# Patient Record
Sex: FEMALE | Race: WHITE | Employment: OTHER | URBAN - METROPOLITAN AREA
[De-identification: names, ages, dates, MRNs, and addresses within clinical notes are randomized per-mention and may not be internally consistent; named-entity substitution may affect disease eponyms.]

---

## 2017-01-03 ENCOUNTER — TELEPHONE (OUTPATIENT)
Dept: OBGYN CLINIC | Facility: CLINIC | Age: 37
End: 2017-01-03

## 2017-07-18 ENCOUNTER — TELEPHONE (OUTPATIENT)
Dept: OBGYN CLINIC | Facility: CLINIC | Age: 37
End: 2017-07-18

## 2017-07-18 NOTE — TELEPHONE ENCOUNTER
Pt needs a copy of her lab report with pap smear result w Dr. Carmita Prieto on 6/14/16 , Pt would like this faxed to Drs fax FAX # [phone] . Can this be faxed td? Call once sent [facility] if no answer.  #[phone]. Also mail a copy to home address.

## 2017-10-24 ENCOUNTER — TELEPHONE (OUTPATIENT)
Dept: PEDIATRICS CLINIC | Facility: CLINIC | Age: 37
End: 2017-10-24

## 2017-10-24 NOTE — TELEPHONE ENCOUNTER
Pt states that she started bleeding heavily again today and spoke with fertility specialist. Pt has an [address] and saw the heartbeat. Pt would like to be seen next week as she was told that she ,ay have bleeding off and on.  Pt declines appt today, would like to

## 2017-10-24 NOTE — TELEPHONE ENCOUNTER
Pt calling to report that she is currently seeing IVF and would like to start care here. Pt stated her LMP is 9/6/17 and transfer date with IVF was 9/25/17. Pt stated she is about 6 weeks.  Pt informed that our practice has 6 MDs and pt will rotate through

## 2017-10-25 ENCOUNTER — TELEPHONE (OUTPATIENT)
Dept: OBGYN CLINIC | Facility: CLINIC | Age: 37
End: 2017-10-25

## 2017-10-25 NOTE — TELEPHONE ENCOUNTER
Pt notified we received 10 out of 21 pages of records. Pt states she will bring all records when she comes in for appt.

## 2017-10-31 ENCOUNTER — TELEPHONE (OUTPATIENT)
Dept: OBGYN CLINIC | Facility: CLINIC | Age: 37
End: 2017-10-31

## 2017-10-31 ENCOUNTER — OFFICE VISIT (OUTPATIENT)
Dept: OBGYN CLINIC | Facility: CLINIC | Age: 37
End: 2017-10-31

## 2017-10-31 VITALS
DIASTOLIC BLOOD PRESSURE: 88 MMHG | BODY MASS INDEX: 32 KG/M2 | HEART RATE: 82 BPM | WEIGHT: 147 LBS | SYSTOLIC BLOOD PRESSURE: 136 MMHG

## 2017-10-31 DIAGNOSIS — O20.9 FIRST TRIMESTER BLEEDING: Primary | ICD-10-CM

## 2017-10-31 PROCEDURE — 76815 OB US LIMITED FETUS(S): CPT | Performed by: OBSTETRICS & GYNECOLOGY

## 2017-10-31 PROCEDURE — 99213 OFFICE O/P EST LOW 20 MIN: CPT | Performed by: OBSTETRICS & GYNECOLOGY

## 2017-10-31 RX ORDER — PRENATAL VIT/IRON FUM/FOLIC AC 27MG-0.8MG
1 TABLET ORAL DAILY
COMMUNITY
End: 2022-01-04

## 2017-10-31 NOTE — TELEPHONE ENCOUNTER
Patient with first trimester bleeding. She saw her fertility speicialists in the past for it. She is O neg and forgot to discuss rhogam.  Can you see if she received it at the fertility clinic?  Otherwise, she will need rhogam shot

## 2017-10-31 NOTE — TELEPHONE ENCOUNTER
Reviewed with DANNY, pt is actually O POS so no Rhogam needed. Explained to pt and reiterated bleeding precautions. Pt verbalized understanding and has OBN next week.

## 2017-10-31 NOTE — H&P
HPI:  The patient is a 44 yo  @ 7w6d by day 5 SET with OCTAVIA. Presents c/o 2 weeks of intermittent bleeding. She describes the bleeding as a persistent spotting that has fluctuated over the last 2 weeks.   She had seen her reproductive endocrinolog MEDICATIONS:    Current Outpatient Prescriptions:   •  PRENATAL 27-0.8 MG Oral Tab, Take 1 tablet by mouth daily. , Disp: , Rfl:   •  ValACYclovir HCl 1 G Oral Tab, , Disp: , Rfl:   •  Levothyroxine Sodium 25 MCG Oral Tab, Take 25 mcg by mouth before time given bleeding, closed cervix, and positive fetal heart tones. Would recommend patient continue pelvic rest and lifting precautions. Miscarriage precautions were given. The patient's to make an appointment for her nurse visit for prenatal care.   No

## 2017-11-07 ENCOUNTER — NURSE ONLY (OUTPATIENT)
Dept: OBGYN CLINIC | Facility: CLINIC | Age: 37
End: 2017-11-07

## 2017-11-07 VITALS — WEIGHT: 146.19 LBS | HEIGHT: 56 IN | BODY MASS INDEX: 32.89 KG/M2

## 2017-11-07 DIAGNOSIS — Z34.81 ENCOUNTER FOR SUPERVISION OF OTHER NORMAL PREGNANCY IN FIRST TRIMESTER: Primary | ICD-10-CM

## 2017-11-07 RX ORDER — LEVOTHYROXINE SODIUM 0.03 MG/1
25 TABLET ORAL
COMMUNITY
End: 2022-01-04

## 2017-11-07 RX ORDER — PROGESTERONE 50 MG/ML
180 INJECTION, SOLUTION INTRAMUSCULAR DAILY
COMMUNITY
End: 2018-03-22

## 2017-11-07 RX ORDER — ESTRADIOL 2 MG/1
2 TABLET ORAL 2 TIMES DAILY
COMMUNITY
End: 2018-03-22

## 2017-11-07 NOTE — PROGRESS NOTES
Pt seen for OBN appt today, IVF pregnancy. Pt saw [address] 10/31/17 for problem visit, c/o continued spotting today. Per pt, fertility doctor \"mentioned a possible small CATHIE\" but did not confirm. Normal PN labs plus 1 hour GTT ordered.   Pt advised all labs mu Dystrophy No    Neural tube defects No    Sickle Cell Disease or trait No    Marco Antonio-Sachs Disease No    Thalassemia No    Other inherited genetic or chromosomal disorders No    Patient or baby's father had a child with birth defects not listed above No    Pre

## 2017-11-10 ENCOUNTER — LAB ENCOUNTER (OUTPATIENT)
Dept: LAB | Facility: HOSPITAL | Age: 37
End: 2017-11-10
Attending: OBSTETRICS & GYNECOLOGY
Payer: COMMERCIAL

## 2017-11-10 DIAGNOSIS — Z34.81 ENCOUNTER FOR SUPERVISION OF OTHER NORMAL PREGNANCY IN FIRST TRIMESTER: ICD-10-CM

## 2017-11-10 PROCEDURE — 36415 COLL VENOUS BLD VENIPUNCTURE: CPT

## 2017-11-10 PROCEDURE — 86762 RUBELLA ANTIBODY: CPT

## 2017-11-10 PROCEDURE — 82950 GLUCOSE TEST: CPT

## 2017-11-10 PROCEDURE — 87389 HIV-1 AG W/HIV-1&-2 AB AG IA: CPT

## 2017-11-10 PROCEDURE — 86780 TREPONEMA PALLIDUM: CPT

## 2017-11-10 PROCEDURE — 86850 RBC ANTIBODY SCREEN: CPT

## 2017-11-10 PROCEDURE — 87340 HEPATITIS B SURFACE AG IA: CPT

## 2017-11-10 PROCEDURE — 85025 COMPLETE CBC W/AUTO DIFF WBC: CPT

## 2017-11-10 PROCEDURE — 87086 URINE CULTURE/COLONY COUNT: CPT

## 2017-11-10 PROCEDURE — 86901 BLOOD TYPING SEROLOGIC RH(D): CPT

## 2017-11-10 PROCEDURE — 86900 BLOOD TYPING SEROLOGIC ABO: CPT

## 2017-11-14 ENCOUNTER — INITIAL PRENATAL (OUTPATIENT)
Dept: OBGYN CLINIC | Facility: CLINIC | Age: 37
End: 2017-11-14

## 2017-11-14 VITALS
DIASTOLIC BLOOD PRESSURE: 85 MMHG | WEIGHT: 146 LBS | BODY MASS INDEX: 33 KG/M2 | HEART RATE: 65 BPM | SYSTOLIC BLOOD PRESSURE: 130 MMHG

## 2017-11-14 DIAGNOSIS — Z34.91 ENCOUNTER FOR SUPERVISION OF NORMAL PREGNANCY IN FIRST TRIMESTER, UNSPECIFIED GRAVIDITY: Primary | ICD-10-CM

## 2017-11-14 PROCEDURE — 81002 URINALYSIS NONAUTO W/O SCOPE: CPT | Performed by: OBSTETRICS & GYNECOLOGY

## 2017-11-20 ENCOUNTER — TELEPHONE (OUTPATIENT)
Dept: OBGYN CLINIC | Facility: CLINIC | Age: 37
End: 2017-11-20

## 2017-11-25 ENCOUNTER — TELEPHONE (OUTPATIENT)
Dept: OBGYN CLINIC | Facility: CLINIC | Age: 37
End: 2017-11-25

## 2017-11-25 PROBLEM — Z31.83 IN VITRO FERTILIZATION: Status: ACTIVE | Noted: 2017-11-25

## 2017-11-25 PROBLEM — O09.529 AMA (ADVANCED MATERNAL AGE) MULTIGRAVIDA 35+: Status: ACTIVE | Noted: 2017-11-25

## 2017-11-25 PROBLEM — O09.529 AMA (ADVANCED MATERNAL AGE) MULTIGRAVIDA 35+ (HCC): Status: ACTIVE | Noted: 2017-11-25

## 2017-11-25 NOTE — PROGRESS NOTES
42year-old  010 at 9 weeks 6 days by IVF D5 single embryo transfer. The patient complains of persistent brown discharge. She has been previously seen in the office for first trimester bleeding.   She did discuss with her IVF physician earlier in the

## 2017-11-25 NOTE — TELEPHONE ENCOUNTER
Pt needs fetal genetics for AMA and IVF pregnancy. Please coordinate referral.  She will also need a level 2 US and fetal echo.

## 2017-11-27 NOTE — TELEPHONE ENCOUNTER
Orders for all procedure previously noted was routed to [facility], patient informed.

## 2017-12-11 ENCOUNTER — ROUTINE PRENATAL (OUTPATIENT)
Dept: OBGYN CLINIC | Facility: CLINIC | Age: 37
End: 2017-12-11

## 2017-12-11 VITALS
SYSTOLIC BLOOD PRESSURE: 130 MMHG | DIASTOLIC BLOOD PRESSURE: 88 MMHG | WEIGHT: 147 LBS | HEART RATE: 71 BPM | BODY MASS INDEX: 33 KG/M2

## 2017-12-11 DIAGNOSIS — Z34.01 ENCOUNTER FOR SUPERVISION OF NORMAL FIRST PREGNANCY IN FIRST TRIMESTER: Primary | ICD-10-CM

## 2017-12-14 NOTE — PROGRESS NOTES
\" Juan Pablo\". Link at visit. They have Level 2 scheduled in January and Echo to follow. She had PGD for genetics. Her OCTAVIA at [facility] is managing thyroid. We discussed parameters and we need her test results sent.

## 2017-12-17 PROBLEM — E03.8 SUBCLINICAL HYPOTHYROIDISM: Status: ACTIVE | Noted: 2017-12-17

## 2018-01-10 ENCOUNTER — ROUTINE PRENATAL (OUTPATIENT)
Dept: OBGYN CLINIC | Facility: CLINIC | Age: 38
End: 2018-01-10

## 2018-01-10 ENCOUNTER — TELEPHONE (OUTPATIENT)
Dept: OBGYN CLINIC | Facility: CLINIC | Age: 38
End: 2018-01-10

## 2018-01-10 VITALS
WEIGHT: 150 LBS | BODY MASS INDEX: 34 KG/M2 | HEART RATE: 85 BPM | SYSTOLIC BLOOD PRESSURE: 132 MMHG | DIASTOLIC BLOOD PRESSURE: 91 MMHG

## 2018-01-10 DIAGNOSIS — Z34.92 ENCOUNTER FOR SUPERVISION OF NORMAL PREGNANCY IN SECOND TRIMESTER, UNSPECIFIED GRAVIDITY: Primary | ICD-10-CM

## 2018-01-10 DIAGNOSIS — O16.2 ELEVATED BLOOD PRESSURE AFFECTING PREGNANCY IN SECOND TRIMESTER, ANTEPARTUM: Primary | ICD-10-CM

## 2018-01-10 LAB
MULTISTIX LOT#: NORMAL NUMERIC
PH, URINE: 8 (ref 4.5–8)
SPECIFIC GRAVITY: 1 (ref 1–1.03)
UROBILINOGEN,SEMI-QN: 0 MG/DL (ref 0–1.9)

## 2018-01-10 PROCEDURE — 81002 URINALYSIS NONAUTO W/O SCOPE: CPT | Performed by: OBSTETRICS & GYNECOLOGY

## 2018-01-10 NOTE — PROGRESS NOTES
Prefers \" Keny\", has US @ [facility] next week, had TSH done with endo last month- will bring results to next visit, elevated BP today- will order baseline labs- pt states she had elevated BP's noted during IVF tx

## 2018-01-10 NOTE — TELEPHONE ENCOUNTER
Pt informed of CAPs recs and verbalized understanding. Spoke with Trini Zapata from lab who stated that pts are encouraged to fast for 12 hours prior to [address]. Pt accepted BP check appt at 10am and will go to lab prior to appt to complete labs.

## 2018-01-10 NOTE — TELEPHONE ENCOUNTER
Please inform pt that I ordered a baseline set of labs for preeclampsia since her BP was elevated in clinic today, please have her come in tomorrow for BP check and to go to lab to have labs drawn

## 2018-01-11 ENCOUNTER — TELEPHONE (OUTPATIENT)
Dept: PEDIATRICS CLINIC | Facility: CLINIC | Age: 38
End: 2018-01-11

## 2018-01-11 ENCOUNTER — NURSE ONLY (OUTPATIENT)
Dept: OBGYN CLINIC | Facility: CLINIC | Age: 38
End: 2018-01-11

## 2018-01-11 ENCOUNTER — APPOINTMENT (OUTPATIENT)
Dept: LAB | Facility: HOSPITAL | Age: 38
End: 2018-01-11
Attending: OBSTETRICS & GYNECOLOGY
Payer: COMMERCIAL

## 2018-01-11 VITALS — SYSTOLIC BLOOD PRESSURE: 120 MMHG | DIASTOLIC BLOOD PRESSURE: 84 MMHG | HEART RATE: 83 BPM

## 2018-01-11 DIAGNOSIS — O16.2 ELEVATED BLOOD PRESSURE AFFECTING PREGNANCY IN SECOND TRIMESTER, ANTEPARTUM: ICD-10-CM

## 2018-01-11 DIAGNOSIS — Z01.30 BP CHECK: Primary | ICD-10-CM

## 2018-01-11 LAB
ALBUMIN SERPL BCP-MCNC: 3.2 G/DL (ref 3.5–4.8)
ALBUMIN/GLOB SERPL: 1.1 {RATIO} (ref 1–2)
ALP SERPL-CCNC: 44 U/L (ref 32–100)
ALT SERPL-CCNC: 65 U/L (ref 14–54)
ANION GAP SERPL CALC-SCNC: 5 MMOL/L (ref 0–18)
AST SERPL-CCNC: 39 U/L (ref 15–41)
BILIRUB SERPL-MCNC: 0.6 MG/DL (ref 0.3–1.2)
BUN SERPL-MCNC: 4 MG/DL (ref 8–20)
BUN/CREAT SERPL: 6.5 (ref 10–20)
CALCIUM SERPL-MCNC: 8.9 MG/DL (ref 8.5–10.5)
CHLORIDE SERPL-SCNC: 107 MMOL/L (ref 95–110)
CO2 SERPL-SCNC: 25 MMOL/L (ref 22–32)
CREAT SERPL-MCNC: 0.62 MG/DL (ref 0.5–1.5)
ERYTHROCYTE [DISTWIDTH] IN BLOOD BY AUTOMATED COUNT: 13.8 % (ref 11–15)
GLOBULIN PLAS-MCNC: 2.8 G/DL (ref 2.5–3.7)
GLUCOSE SERPL-MCNC: 86 MG/DL (ref 70–99)
HCT VFR BLD AUTO: 40.1 % (ref 35–48)
HGB BLD-MCNC: 13.6 G/DL (ref 12–16)
MCH RBC QN AUTO: 29 PG (ref 27–32)
MCHC RBC AUTO-ENTMCNC: 33.9 G/DL (ref 32–37)
MCV RBC AUTO: 85.4 FL (ref 80–100)
MULTISTIX LOT#: NORMAL NUMERIC
OSMOLALITY UR CALC.SUM OF ELEC: 280 MOSM/KG (ref 275–295)
PH, URINE: 8 (ref 4.5–8)
PLATELET # BLD AUTO: 235 K/UL (ref 140–400)
PMV BLD AUTO: 10.2 FL (ref 7.4–10.3)
POTASSIUM SERPL-SCNC: 3.8 MMOL/L (ref 3.3–5.1)
PROT SERPL-MCNC: 6 G/DL (ref 5.9–8.4)
RBC # BLD AUTO: 4.69 M/UL (ref 3.7–5.4)
SODIUM SERPL-SCNC: 137 MMOL/L (ref 136–144)
SPECIFIC GRAVITY: 1.01 (ref 1–1.03)
WBC # BLD AUTO: 9.4 K/UL (ref 4–11)

## 2018-01-11 PROCEDURE — 85027 COMPLETE CBC AUTOMATED: CPT

## 2018-01-11 PROCEDURE — 81002 URINALYSIS NONAUTO W/O SCOPE: CPT | Performed by: OBSTETRICS & GYNECOLOGY

## 2018-01-11 PROCEDURE — 36415 COLL VENOUS BLD VENIPUNCTURE: CPT

## 2018-01-11 PROCEDURE — 80053 COMPREHEN METABOLIC PANEL: CPT

## 2018-01-11 PROCEDURE — 99211 OFF/OP EST MAY X REQ PHY/QHP: CPT | Performed by: OBSTETRICS & GYNECOLOGY

## 2018-01-11 NOTE — PROGRESS NOTES
Patient in today for prenatal b/p check epr CAP. Patient denies vision changes and right side epigastric pain but states she has about 2 headaches a week, she just lays down and doesn't need to take anything.  Patient completed baseline preeclampsia lab's t

## 2018-01-11 NOTE — TELEPHONE ENCOUNTER
Amber Connors from [facility] calling to see if pt had Down syndrome screening, carrier screening, spina bifida screening, ultra sound and prenatal records.  Can be faxed over to [phone]

## 2018-01-15 ENCOUNTER — TELEPHONE (OUTPATIENT)
Dept: OBGYN CLINIC | Facility: CLINIC | Age: 38
End: 2018-01-15

## 2018-01-15 NOTE — TELEPHONE ENCOUNTER
Pt is 18w5d, had labs drawn 1/11 after her BP check and is calling for results. Routed to [facility] on call to please review and advise.

## 2018-01-19 ENCOUNTER — TELEPHONE (OUTPATIENT)
Dept: OBGYN CLINIC | Facility: CLINIC | Age: 38
End: 2018-01-19

## 2018-01-29 ENCOUNTER — TELEPHONE (OUTPATIENT)
Dept: OBGYN CLINIC | Facility: CLINIC | Age: 38
End: 2018-01-29

## 2018-01-29 NOTE — TELEPHONE ENCOUNTER
1-16-18 [facility] PANORAMA report to Dr Ramos Meza [facility] folder and Amrik Moles folder

## 2018-02-08 ENCOUNTER — TELEPHONE (OUTPATIENT)
Dept: OBGYN CLINIC | Facility: CLINIC | Age: 38
End: 2018-02-08

## 2018-02-08 NOTE — TELEPHONE ENCOUNTER
CONTRERASTCB. Pt needs to be informed about canceling her appt in the am tomorrow and rescheduling to a later time tomorrow.

## 2018-02-12 ENCOUNTER — ROUTINE PRENATAL (OUTPATIENT)
Dept: OBGYN CLINIC | Facility: CLINIC | Age: 38
End: 2018-02-12

## 2018-02-12 VITALS
DIASTOLIC BLOOD PRESSURE: 90 MMHG | SYSTOLIC BLOOD PRESSURE: 137 MMHG | HEART RATE: 84 BPM | BODY MASS INDEX: 35 KG/M2 | WEIGHT: 155 LBS

## 2018-02-12 DIAGNOSIS — Z34.92 ENCOUNTER FOR SUPERVISION OF NORMAL PREGNANCY IN SECOND TRIMESTER, UNSPECIFIED GRAVIDITY: Primary | ICD-10-CM

## 2018-02-12 LAB
MULTISTIX LOT#: ABNORMAL NUMERIC
PH, URINE: 7 (ref 4.5–8)
SPECIFIC GRAVITY: 1 (ref 1–1.03)

## 2018-02-12 PROCEDURE — 81002 URINALYSIS NONAUTO W/O SCOPE: CPT | Performed by: OBSTETRICS & GYNECOLOGY

## 2018-02-16 ENCOUNTER — TELEPHONE (OUTPATIENT)
Dept: OBGYN CLINIC | Facility: CLINIC | Age: 38
End: 2018-02-16

## 2018-02-16 NOTE — TELEPHONE ENCOUNTER
Pt calling to report that she had bleeding in her first trimester and had to put her gym membership on hold because she was told pelvic rest and no heavy lifting.  Pt stated that she would like to go back to her gym, but gym is requiring a note from the doc

## 2018-02-16 NOTE — TELEPHONE ENCOUNTER
SPOKE WITH PT AND SHE ASKED THAT THE NOTE STATES NO RESTRICTIONS BECAUSE SHE WILL ONLY BE WALKING AND SWIMMING THERE AS THEY OFFER NO PRENATAL EXERCISE PROGRAMS. SHE IS AWARE LETTER WILL BE SENT TO HER IN MY CHART. LETTER SENT.

## 2018-02-20 ENCOUNTER — TELEPHONE (OUTPATIENT)
Dept: OBGYN CLINIC | Facility: CLINIC | Age: 38
End: 2018-02-20

## 2018-02-20 NOTE — TELEPHONE ENCOUNTER
Pt is 23w6d and reports having a cough and sore throat for \"a little while\" but states it has been getting wore since the past 3 days. Assessed pt's report of chest pain and states it is not true CP just reports chest is sore from coughing.  Pt denies fev

## 2018-02-20 NOTE — TELEPHONE ENCOUNTER
----- Message from [facility] sent at 2/20/2018 10:06 AM CST -----  Regarding: Other  Contact: [phone]  Good morning,    Below are a list of blood pressure result for this past week, as per your request on my last office visit. I will continue to monitor my blood pressure.     2/12 - 147/96 (8:29 pm)  2/13- 137/90 (8:09 am)  2/13 - 134/99 (10:44 pm)  2/14 - 131/90 (7:03 am)  2/15 - 135/92 (6:48 pm)  2/16 - 123/88 (8:42 am)  2/16 - 133/90 (10:01 pm)  2/17 - 120/79 (8:27 am)  2/17 - 135/99 (9:32 pm)  2/18 - 125/88 (9:40 am)  2/19 - 122/82 (8:45 am)  2/19 - 130/87 (8:47 pm)    Best regards,  [facility]  [phone]

## 2018-02-20 NOTE — TELEPHONE ENCOUNTER
Pt is 23 wks pregnant, has cough, sore throat, and chest pain. Difficulty breathing when on long walks, going up stairs. pls adv.

## 2018-02-21 NOTE — PROGRESS NOTES
Discussed BP ( repeat is 133/92. Start BID home BP's and email weekly. Echo 02-14 and f/u [facility] 02-21. She declines flu vaccine.

## 2018-02-22 ENCOUNTER — TELEPHONE (OUTPATIENT)
Dept: OBGYN CLINIC | Facility: CLINIC | Age: 38
End: 2018-02-22

## 2018-02-24 ENCOUNTER — PATIENT MESSAGE (OUTPATIENT)
Dept: OBGYN CLINIC | Facility: CLINIC | Age: 38
End: 2018-02-24

## 2018-02-26 NOTE — TELEPHONE ENCOUNTER
From: Geovany Ca  To: Bethany Pritchett MD  Sent: 2/24/2018 7:30 PM CST  Subject: Other    Hello,    I had my Tyroid check from [facility] on 12/21/17 & 2/21/18 and the results are below.  They have advised me the monitoring needs to be do

## 2018-03-02 ENCOUNTER — APPOINTMENT (OUTPATIENT)
Dept: LAB | Facility: HOSPITAL | Age: 38
End: 2018-03-02
Attending: OBSTETRICS & GYNECOLOGY
Payer: COMMERCIAL

## 2018-03-02 DIAGNOSIS — Z34.92 ENCOUNTER FOR SUPERVISION OF NORMAL PREGNANCY IN SECOND TRIMESTER, UNSPECIFIED GRAVIDITY: ICD-10-CM

## 2018-03-02 LAB
ERYTHROCYTE [DISTWIDTH] IN BLOOD BY AUTOMATED COUNT: 14 % (ref 11–15)
GLUCOSE 1H P 50 G GLC PO SERPL-MCNC: 108 MG/DL
HCT VFR BLD AUTO: 35.4 % (ref 35–48)
HGB BLD-MCNC: 12.5 G/DL (ref 12–16)
MCH RBC QN AUTO: 29.8 PG (ref 27–32)
MCHC RBC AUTO-ENTMCNC: 35.2 G/DL (ref 32–37)
MCV RBC AUTO: 84.7 FL (ref 80–100)
PLATELET # BLD AUTO: 253 K/UL (ref 140–400)
PMV BLD AUTO: 9.2 FL (ref 7.4–10.3)
RBC # BLD AUTO: 4.18 M/UL (ref 3.7–5.4)
WBC # BLD AUTO: 9.2 K/UL (ref 4–11)

## 2018-03-02 PROCEDURE — 82950 GLUCOSE TEST: CPT

## 2018-03-02 PROCEDURE — 36415 COLL VENOUS BLD VENIPUNCTURE: CPT

## 2018-03-02 PROCEDURE — 85027 COMPLETE CBC AUTOMATED: CPT

## 2018-03-08 NOTE — TELEPHONE ENCOUNTER
1-25-18 [facility] genetic conulstation rcv'd; placed on Dr Vega sparks and Jeanna sparks

## 2018-03-09 ENCOUNTER — ROUTINE PRENATAL (OUTPATIENT)
Dept: OBGYN CLINIC | Facility: CLINIC | Age: 38
End: 2018-03-09

## 2018-03-09 VITALS
SYSTOLIC BLOOD PRESSURE: 129 MMHG | HEART RATE: 89 BPM | DIASTOLIC BLOOD PRESSURE: 84 MMHG | BODY MASS INDEX: 35 KG/M2 | WEIGHT: 155 LBS

## 2018-03-09 DIAGNOSIS — Z34.02 ENCOUNTER FOR SUPERVISION OF NORMAL FIRST PREGNANCY IN SECOND TRIMESTER: Primary | ICD-10-CM

## 2018-03-09 LAB
APPEARANCE: CLEAR
MULTISTIX LOT#: NORMAL NUMERIC

## 2018-03-09 PROCEDURE — 81002 URINALYSIS NONAUTO W/O SCOPE: CPT | Performed by: OBSTETRICS & GYNECOLOGY

## 2018-03-10 PROBLEM — Z34.90 NORMAL INTRAUTERINE PREGNANCY ON PRENATAL ULTRASOUND, ANTEPARTUM (HCC): Status: ACTIVE | Noted: 2018-03-10

## 2018-03-10 PROBLEM — O99.210 OBESITY AFFECTING PREGNANCY, ANTEPARTUM: Status: ACTIVE | Noted: 2018-03-10

## 2018-03-10 PROBLEM — O99.210 OBESITY AFFECTING PREGNANCY, ANTEPARTUM (HCC): Status: ACTIVE | Noted: 2018-03-10

## 2018-03-10 PROBLEM — Z34.90 NORMAL INTRAUTERINE PREGNANCY ON PRENATAL ULTRASOUND, ANTEPARTUM: Status: ACTIVE | Noted: 2018-03-10

## 2018-03-10 NOTE — PROGRESS NOTES
Takes synthyroid med 0.25 every M / W / F -- prior med from OCTAVIA -- has not transferred to PCP yet. Next M u/s next week. Declined flu shot.  RTC 2 wks

## 2018-03-22 ENCOUNTER — ROUTINE PRENATAL (OUTPATIENT)
Dept: OBGYN CLINIC | Facility: CLINIC | Age: 38
End: 2018-03-22

## 2018-03-22 ENCOUNTER — TELEPHONE (OUTPATIENT)
Dept: OBGYN CLINIC | Facility: CLINIC | Age: 38
End: 2018-03-22

## 2018-03-22 VITALS
HEART RATE: 101 BPM | DIASTOLIC BLOOD PRESSURE: 86 MMHG | SYSTOLIC BLOOD PRESSURE: 133 MMHG | WEIGHT: 158.19 LBS | BODY MASS INDEX: 35 KG/M2

## 2018-03-22 DIAGNOSIS — Z34.93 ENCOUNTER FOR SUPERVISION OF NORMAL PREGNANCY IN THIRD TRIMESTER, UNSPECIFIED GRAVIDITY: Primary | ICD-10-CM

## 2018-03-22 LAB
APPEARANCE: CLEAR
MULTISTIX LOT#: NORMAL NUMERIC
PH, URINE: 7.5 (ref 4.5–8)
SPECIFIC GRAVITY: 1 (ref 1–1.03)
URINE-COLOR: YELLOW
UROBILINOGEN,SEMI-QN: 0.2 MG/DL (ref 0–1.9)

## 2018-03-22 PROCEDURE — 90471 IMMUNIZATION ADMIN: CPT | Performed by: OBSTETRICS & GYNECOLOGY

## 2018-03-22 PROCEDURE — 81002 URINALYSIS NONAUTO W/O SCOPE: CPT | Performed by: OBSTETRICS & GYNECOLOGY

## 2018-03-22 PROCEDURE — 90715 TDAP VACCINE 7 YRS/> IM: CPT | Performed by: OBSTETRICS & GYNECOLOGY

## 2018-03-22 NOTE — TELEPHONE ENCOUNTER
OB US dated 3/21/18 received from [address] [place] [address] and placed in DANNY's folder for review. Anika Medel made a placed in brown file.

## 2018-04-04 ENCOUNTER — ROUTINE PRENATAL (OUTPATIENT)
Dept: OBGYN CLINIC | Facility: CLINIC | Age: 38
End: 2018-04-04

## 2018-04-04 ENCOUNTER — HOSPITAL ENCOUNTER (INPATIENT)
Facility: HOSPITAL | Age: 38
LOS: 1 days | Discharge: HOME | DRG: 781 | End: 2018-04-05
Attending: OBSTETRICS & GYNECOLOGY | Admitting: OBSTETRICS & GYNECOLOGY
Payer: COMMERCIAL

## 2018-04-04 VITALS
SYSTOLIC BLOOD PRESSURE: 136 MMHG | WEIGHT: 161 LBS | BODY MASS INDEX: 36 KG/M2 | HEART RATE: 99 BPM | DIASTOLIC BLOOD PRESSURE: 96 MMHG

## 2018-04-04 DIAGNOSIS — Z3A.30 30 WEEKS GESTATION OF PREGNANCY: Primary | ICD-10-CM

## 2018-04-04 PROBLEM — O13.9 GESTATIONAL HYPERTENSION (HCC): Status: ACTIVE | Noted: 2018-04-04

## 2018-04-04 PROBLEM — O16.9 HYPERTENSION AFFECTING PREGNANCY, ANTEPARTUM (HCC): Status: ACTIVE | Noted: 2018-04-04

## 2018-04-04 PROBLEM — O16.9 HYPERTENSION AFFECTING PREGNANCY, ANTEPARTUM: Status: ACTIVE | Noted: 2018-04-04

## 2018-04-04 PROBLEM — O13.9 GESTATIONAL HYPERTENSION: Status: ACTIVE | Noted: 2018-04-04

## 2018-04-04 PROCEDURE — 81002 URINALYSIS NONAUTO W/O SCOPE: CPT | Performed by: OBSTETRICS & GYNECOLOGY

## 2018-04-04 PROCEDURE — 99219 INITIAL OBSERVATION CARE,LEVL II: CPT | Performed by: OBSTETRICS & GYNECOLOGY

## 2018-04-04 RX ORDER — FAMOTIDINE 20 MG/1
20 TABLET ORAL 2 TIMES DAILY PRN
Status: DISCONTINUED | OUTPATIENT
Start: 2018-04-04 | End: 2018-04-06

## 2018-04-04 RX ORDER — ACETAMINOPHEN 500 MG
500 TABLET ORAL ONCE AS NEEDED
Status: DISCONTINUED | OUTPATIENT
Start: 2018-04-04 | End: 2018-04-06

## 2018-04-05 ENCOUNTER — TELEPHONE (OUTPATIENT)
Dept: OBGYN CLINIC | Facility: CLINIC | Age: 38
End: 2018-04-05

## 2018-04-05 VITALS
SYSTOLIC BLOOD PRESSURE: 145 MMHG | HEART RATE: 88 BPM | OXYGEN SATURATION: 98 % | DIASTOLIC BLOOD PRESSURE: 88 MMHG | TEMPERATURE: 98 F | RESPIRATION RATE: 18 BRPM

## 2018-04-05 PROCEDURE — 99225 SUBSEQUENT OBSERVATION CARE: CPT | Performed by: OBSTETRICS & GYNECOLOGY

## 2018-04-05 RX ORDER — SODIUM CHLORIDE, SODIUM LACTATE, POTASSIUM CHLORIDE, CALCIUM CHLORIDE 600; 310; 30; 20 MG/100ML; MG/100ML; MG/100ML; MG/100ML
INJECTION, SOLUTION INTRAVENOUS CONTINUOUS
Status: DISCONTINUED | OUTPATIENT
Start: 2018-04-05 | End: 2018-04-06

## 2018-04-05 RX ORDER — BETAMETHASONE SODIUM PHOSPHATE AND BETAMETHASONE ACETATE 3; 3 MG/ML; MG/ML
12 INJECTION, SUSPENSION INTRA-ARTICULAR; INTRALESIONAL; INTRAMUSCULAR; SOFT TISSUE EVERY 24 HOURS
Status: DISCONTINUED | OUTPATIENT
Start: 2018-04-05 | End: 2018-04-06

## 2018-04-05 RX ORDER — 0.9 % SODIUM CHLORIDE 0.9 %
VIAL (ML) INJECTION
Status: DISCONTINUED
Start: 2018-04-05 | End: 2018-04-06

## 2018-04-05 RX ORDER — SODIUM CHLORIDE, SODIUM LACTATE, POTASSIUM CHLORIDE, CALCIUM CHLORIDE 600; 310; 30; 20 MG/100ML; MG/100ML; MG/100ML; MG/100ML
INJECTION, SOLUTION INTRAVENOUS
Status: COMPLETED
Start: 2018-04-05 | End: 2018-04-05

## 2018-04-05 RX ORDER — TERBUTALINE SULFATE 1 MG/ML
INJECTION, SOLUTION SUBCUTANEOUS
Status: COMPLETED
Start: 2018-04-05 | End: 2018-04-05

## 2018-04-05 RX ORDER — TERBUTALINE SULFATE 1 MG/ML
0.25 INJECTION, SOLUTION SUBCUTANEOUS
Status: ACTIVE | OUTPATIENT
Start: 2018-04-05 | End: 2018-04-05

## 2018-04-05 NOTE — PROGRESS NOTES
I had asked patient to begin home BP's after visit at 22 5/7. Doing home BP's with diary on phone through 03-05 but not recorded on phone again until April. Per patient, BP's 120's to 130's / 80's - 95.  Repeat here was 155/113 on left and 149/108 on right

## 2018-04-05 NOTE — PROGRESS NOTES
Call to Dr Castillo regarding pt contractions. Per Dr Bobby Gastelum T/O for 500mL LR bolus and then 500 mL/hr for a total of 1L. If contractions do not stop after liter bolus, administer terbutaline. Will ctm.

## 2018-04-05 NOTE — PROGRESS NOTES
[facility]    Labor Progress Note    Clarisa Holly Patient Status:  Inpatient    11/3/1980 MRN [id number]   Location [address] [facility] Attending Raudel Simms MD   Hosp Day # 1 PCP Unknown Pcp       Subject

## 2018-04-05 NOTE — H&P
[facility]    History & Physical    Nadya Cole Patient Status:  Inpatient    11/3/1980 MRN [id number]   Location [address]  Attending Sridevi Jimenez MD   Hosp Day # 0 PCP Unknown Pcp     Date of Ad Unknown, Rash  Medications:    Prescriptions Prior to Admission:  Levothyroxine Sodium 25 MCG Oral Tab Take 25 mcg by mouth before breakfast. Disp:  Rfl:  4/4/2018 at Unknown time   PRENATAL 27-0.8 MG Oral Tab Take 1 tablet by mouth daily.  Disp:  Rfl:

## 2018-04-06 ENCOUNTER — APPOINTMENT (OUTPATIENT)
Dept: OBGYN CLINIC | Facility: HOSPITAL | Age: 38
End: 2018-04-06
Payer: COMMERCIAL

## 2018-04-06 ENCOUNTER — HOSPITAL ENCOUNTER (OUTPATIENT)
Facility: HOSPITAL | Age: 38
Setting detail: OBSERVATION
Discharge: HOME HEALTH | End: 2018-04-06
Attending: OBSTETRICS & GYNECOLOGY | Admitting: OBSTETRICS & GYNECOLOGY
Payer: COMMERCIAL

## 2018-04-06 VITALS — SYSTOLIC BLOOD PRESSURE: 129 MMHG | HEART RATE: 89 BPM | DIASTOLIC BLOOD PRESSURE: 83 MMHG

## 2018-04-06 PROBLEM — Z34.90 PREGNANCY: Status: ACTIVE | Noted: 2018-04-06

## 2018-04-06 PROBLEM — Z34.90 PREGNANCY (HCC): Status: ACTIVE | Noted: 2018-04-06

## 2018-04-06 PROCEDURE — 59025 FETAL NON-STRESS TEST: CPT | Performed by: OBSTETRICS & GYNECOLOGY

## 2018-04-06 RX ORDER — BETAMETHASONE SODIUM PHOSPHATE AND BETAMETHASONE ACETATE 3; 3 MG/ML; MG/ML
12 INJECTION, SUSPENSION INTRA-ARTICULAR; INTRALESIONAL; INTRAMUSCULAR; SOFT TISSUE EVERY 24 HOURS
Status: DISCONTINUED | OUTPATIENT
Start: 2018-04-06 | End: 2018-04-07

## 2018-04-06 RX ORDER — BETAMETHASONE SODIUM PHOSPHATE AND BETAMETHASONE ACETATE 3; 3 MG/ML; MG/ML
INJECTION, SUSPENSION INTRA-ARTICULAR; INTRALESIONAL; INTRAMUSCULAR; SOFT TISSUE
Status: COMPLETED
Start: 2018-04-06 | End: 2018-04-06

## 2018-04-06 NOTE — PROGRESS NOTES
Discharge instructions explained to patient by RN. Patient to return to [facility] tomorrow at 2130 for 2nd dose betamethasone and NST. Patient to then have weekly NST's Q Friday. Preeclampsia signs/symptoms explained to patient.   Patient to take b/p at

## 2018-04-06 NOTE — PROGRESS NOTES
24h UP collection wnl. No concerns for preE. BP normal to mild range. Spoke with Dr. Miguel Johnson at [facility] . Recs for BMTZ x 2 doses. Is okay with dc given gestational HTN and outpatient mgmt.   Will have patient come back tomorrow night for NST/Vitals/2nd BMTZ

## 2018-04-07 NOTE — NST
Nonstress Test   Patient: Ernestina Obrien    Gestation: 30w2d    NST:                                                                                                        Nonstress Test Interpretation: Reactive           Nonstress Test Second Inter

## 2018-04-09 ENCOUNTER — ROUTINE PRENATAL (OUTPATIENT)
Dept: OBGYN CLINIC | Facility: CLINIC | Age: 38
End: 2018-04-09

## 2018-04-09 VITALS
SYSTOLIC BLOOD PRESSURE: 149 MMHG | WEIGHT: 160.19 LBS | HEART RATE: 105 BPM | BODY MASS INDEX: 36 KG/M2 | DIASTOLIC BLOOD PRESSURE: 95 MMHG

## 2018-04-09 DIAGNOSIS — Z3A.30 30 WEEKS GESTATION OF PREGNANCY: Primary | ICD-10-CM

## 2018-04-09 PROCEDURE — 81002 URINALYSIS NONAUTO W/O SCOPE: CPT | Performed by: OBSTETRICS & GYNECOLOGY

## 2018-04-09 NOTE — PROGRESS NOTES
No preE symptoms. BPs at home 120-130s/70-90s. In hospital last week for gHTN. Neg 24h UP collection and BMTZ x 2 doses. Weekly NSTs (scheduled) and weekly office visits. IOL at 37 wks for gHTN. Watch closely for PreE.   BP parameters and PreE sx revi

## 2018-04-12 NOTE — PAYOR COMM NOTE
--------------  ADMISSION REVIEW     Payor: Ana FLANAGAN/FER  Subscriber #:  [id number]  Authorization Number: [id number]    Admit date: 4/4/18  Admit time: 2156           History & Physical    Date of Admission:  4/4/2018      HPI:   Vasyl Peraza PRENATAL 27-0.8 MG Oral Tab Take 1 tablet by mouth daily.  Disp:  Rfl:  4/3/2018 at Unknown time   ValACYclovir HCl 1 G Oral Tab  Disp:  Rfl:  More than a month at Unknown time       Review of Systems:   As documented in HPI      Physical Exam:   Pulse:

## 2018-04-13 ENCOUNTER — HOSPITAL ENCOUNTER (OUTPATIENT)
Dept: PERINATAL CARE | Facility: HOSPITAL | Age: 38
Discharge: HOME | End: 2018-04-13
Attending: OBSTETRICS & GYNECOLOGY
Payer: COMMERCIAL

## 2018-04-13 VITALS — SYSTOLIC BLOOD PRESSURE: 137 MMHG | DIASTOLIC BLOOD PRESSURE: 82 MMHG

## 2018-04-13 DIAGNOSIS — Z31.83 IN VITRO FERTILIZATION: ICD-10-CM

## 2018-04-13 DIAGNOSIS — O09.523 ELDERLY MULTIGRAVIDA IN THIRD TRIMESTER: ICD-10-CM

## 2018-04-13 DIAGNOSIS — O09.529 AMA (ADVANCED MATERNAL AGE) MULTIGRAVIDA 35+: ICD-10-CM

## 2018-04-13 DIAGNOSIS — O16.9 HYPERTENSION AFFECTING PREGNANCY, ANTEPARTUM: Primary | ICD-10-CM

## 2018-04-13 DIAGNOSIS — O99.210 OBESITY AFFECTING PREGNANCY, ANTEPARTUM: ICD-10-CM

## 2018-04-13 PROCEDURE — 59025 FETAL NON-STRESS TEST: CPT | Performed by: OBSTETRICS & GYNECOLOGY

## 2018-04-13 NOTE — NST
Nonstress Test   Patient: Keny Channel    Gestation: 31w2d    NST: ama ivf elevated bp       Variability: Moderate           Accelerations: Yes           Decelerations: None            Baseline: 140 BPM           Uterine Irritability: No

## 2018-04-14 NOTE — ADDENDUM NOTE
Encounter addended by: Julissa Gonzalez MD on: 4/13/2018  7:34 PM<BR>    Actions taken: Sign clinical note, Visit diagnoses modified, Charge Capture section accepted

## 2018-04-18 ENCOUNTER — TELEPHONE (OUTPATIENT)
Dept: OBGYN CLINIC | Facility: CLINIC | Age: 38
End: 2018-04-18

## 2018-04-18 ENCOUNTER — ROUTINE PRENATAL (OUTPATIENT)
Dept: OBGYN CLINIC | Facility: CLINIC | Age: 38
End: 2018-04-18

## 2018-04-18 VITALS
WEIGHT: 157 LBS | SYSTOLIC BLOOD PRESSURE: 135 MMHG | BODY MASS INDEX: 35 KG/M2 | HEART RATE: 101 BPM | DIASTOLIC BLOOD PRESSURE: 89 MMHG

## 2018-04-18 DIAGNOSIS — Z34.93 ENCOUNTER FOR SUPERVISION OF NORMAL PREGNANCY IN THIRD TRIMESTER, UNSPECIFIED GRAVIDITY: Primary | ICD-10-CM

## 2018-04-18 PROCEDURE — 81002 URINALYSIS NONAUTO W/O SCOPE: CPT | Performed by: OBSTETRICS & GYNECOLOGY

## 2018-04-18 RX ORDER — RANITIDINE 15 MG/ML
SOLUTION ORAL 2 TIMES DAILY
Status: ON HOLD | COMMUNITY
End: 2018-05-22

## 2018-04-18 NOTE — TELEPHONE ENCOUNTER
Clarisa PENA  requesting f/u info on pt regarding hospital stay, would like to verify details. pls adv.

## 2018-04-18 NOTE — PROGRESS NOTES
Irregular BH contractions. Reviewed PTL precautions. US today at [facility] and will start weekly BPP. Plan for weekly visit and BP logs here.   RTC 1 wk

## 2018-04-20 ENCOUNTER — HOSPITAL ENCOUNTER (OUTPATIENT)
Dept: PERINATAL CARE | Facility: HOSPITAL | Age: 38
Discharge: HOME | End: 2018-04-20
Attending: OBSTETRICS & GYNECOLOGY
Payer: COMMERCIAL

## 2018-04-20 ENCOUNTER — TELEPHONE (OUTPATIENT)
Dept: OBGYN CLINIC | Facility: CLINIC | Age: 38
End: 2018-04-20

## 2018-04-20 DIAGNOSIS — O13.3 PREGNANCY-INDUCED HYPERTENSION IN THIRD TRIMESTER: ICD-10-CM

## 2018-04-20 DIAGNOSIS — O16.9 HYPERTENSION AFFECTING PREGNANCY, ANTEPARTUM: Primary | ICD-10-CM

## 2018-04-20 DIAGNOSIS — O99.210 OBESITY AFFECTING PREGNANCY, ANTEPARTUM: ICD-10-CM

## 2018-04-20 DIAGNOSIS — O09.523 ELDERLY MULTIGRAVIDA IN THIRD TRIMESTER: ICD-10-CM

## 2018-04-20 PROCEDURE — 59025 FETAL NON-STRESS TEST: CPT | Performed by: OBSTETRICS & GYNECOLOGY

## 2018-04-20 NOTE — NST
Nonstress Test   Patient: Mary Estrada    Gestation: 32w2d    NST: ama suspected preeclampsia       Variability: Moderate           Accelerations: Yes           Decelerations: None            Baseline: 145 BPM           Uterine Irritability: Yes

## 2018-04-25 ENCOUNTER — ROUTINE PRENATAL (OUTPATIENT)
Dept: OBGYN CLINIC | Facility: CLINIC | Age: 38
End: 2018-04-25

## 2018-04-25 VITALS
BODY MASS INDEX: 36 KG/M2 | WEIGHT: 159 LBS | DIASTOLIC BLOOD PRESSURE: 92 MMHG | HEART RATE: 82 BPM | SYSTOLIC BLOOD PRESSURE: 144 MMHG

## 2018-04-25 DIAGNOSIS — Z34.93 ENCOUNTER FOR SUPERVISION OF NORMAL PREGNANCY IN THIRD TRIMESTER, UNSPECIFIED GRAVIDITY: Primary | ICD-10-CM

## 2018-04-25 PROBLEM — Z34.90 PREGNANCY: Status: RESOLVED | Noted: 2018-04-06 | Resolved: 2018-04-25

## 2018-04-25 PROBLEM — Z34.90 PREGNANCY (HCC): Status: RESOLVED | Noted: 2018-04-06 | Resolved: 2018-04-25

## 2018-04-25 PROCEDURE — 81002 URINALYSIS NONAUTO W/O SCOPE: CPT | Performed by: OBSTETRICS & GYNECOLOGY

## 2018-04-26 ENCOUNTER — TELEPHONE (OUTPATIENT)
Dept: OBGYN CLINIC | Facility: CLINIC | Age: 38
End: 2018-04-26

## 2018-04-26 NOTE — PROGRESS NOTES
Doing BPP every  at [facility] but they do not check BP during BPP. Office visits every [employer]. Then getting NSTs every Friday. Will d/w MD group best option of  surveillance. Had detailed birth plan -- reviewed w/ pt -- will revise & return back.  RTC

## 2018-04-27 ENCOUNTER — TELEPHONE (OUTPATIENT)
Dept: OBGYN CLINIC | Facility: CLINIC | Age: 38
End: 2018-04-27

## 2018-04-27 NOTE — TELEPHONE ENCOUNTER
PER MD MEETING THIS MORNING, PT DOES NOT HAVE TO DO NST'S BUT CONTINUE BPP'S AT [address] [place] [address]. SHE DOES NEED TO CONTINUE CHECKING HER BLOOD PRESSURE 3 TIMES DAILY AND SEND LOGS TO US WEEKLY OR BRING TO HER APPTS.    PT NOTIFIED OF RECS AND VERBALIZED Sanchez Hsu

## 2018-05-01 ENCOUNTER — ROUTINE PRENATAL (OUTPATIENT)
Dept: OBGYN CLINIC | Facility: CLINIC | Age: 38
End: 2018-05-01

## 2018-05-01 VITALS
DIASTOLIC BLOOD PRESSURE: 82 MMHG | WEIGHT: 162 LBS | HEART RATE: 94 BPM | BODY MASS INDEX: 36 KG/M2 | SYSTOLIC BLOOD PRESSURE: 125 MMHG

## 2018-05-01 DIAGNOSIS — Z34.93 ENCOUNTER FOR SUPERVISION OF NORMAL PREGNANCY IN THIRD TRIMESTER, UNSPECIFIED GRAVIDITY: Primary | ICD-10-CM

## 2018-05-01 PROCEDURE — 81002 URINALYSIS NONAUTO W/O SCOPE: CPT | Performed by: OBSTETRICS & GYNECOLOGY

## 2018-05-03 NOTE — PROGRESS NOTES
BP improves w/ rest. Plan for IOL at 37 wks unless severe pre-eclampsia occurs. Home BP log mostly 120s-130s / 80-91.  No Sx.

## 2018-05-07 ENCOUNTER — TELEPHONE (OUTPATIENT)
Dept: OBGYN CLINIC | Facility: CLINIC | Age: 38
End: 2018-05-07

## 2018-05-07 ENCOUNTER — HOSPITAL ENCOUNTER (OUTPATIENT)
Facility: HOSPITAL | Age: 38
Setting detail: OBSERVATION
Discharge: HOME | End: 2018-05-07
Attending: OBSTETRICS & GYNECOLOGY | Admitting: OBSTETRICS & GYNECOLOGY
Payer: COMMERCIAL

## 2018-05-07 ENCOUNTER — ROUTINE PRENATAL (OUTPATIENT)
Dept: OBGYN CLINIC | Facility: CLINIC | Age: 38
End: 2018-05-07

## 2018-05-07 VITALS
SYSTOLIC BLOOD PRESSURE: 152 MMHG | BODY MASS INDEX: 37 KG/M2 | HEART RATE: 92 BPM | DIASTOLIC BLOOD PRESSURE: 103 MMHG | WEIGHT: 164 LBS

## 2018-05-07 VITALS — HEART RATE: 87 BPM | DIASTOLIC BLOOD PRESSURE: 83 MMHG | SYSTOLIC BLOOD PRESSURE: 121 MMHG

## 2018-05-07 DIAGNOSIS — Z34.93 ENCOUNTER FOR SUPERVISION OF NORMAL PREGNANCY IN THIRD TRIMESTER, UNSPECIFIED GRAVIDITY: Primary | ICD-10-CM

## 2018-05-07 PROBLEM — O16.3 HIGH BLOOD PRESSURE AFFECTING PREGNANCY IN THIRD TRIMESTER, ANTEPARTUM (HCC): Status: ACTIVE | Noted: 2018-05-07

## 2018-05-07 PROBLEM — O16.3 HIGH BLOOD PRESSURE AFFECTING PREGNANCY IN THIRD TRIMESTER, ANTEPARTUM: Status: ACTIVE | Noted: 2018-05-07

## 2018-05-07 PROCEDURE — 59025 FETAL NON-STRESS TEST: CPT | Performed by: OBSTETRICS & GYNECOLOGY

## 2018-05-07 PROCEDURE — 81002 URINALYSIS NONAUTO W/O SCOPE: CPT | Performed by: OBSTETRICS & GYNECOLOGY

## 2018-05-07 NOTE — TRIAGE
[facility]      Triage Note    Ren Whelan Patient Status:  Observation    11/3/1980 MRN [id number]   Location [address]  Attending Keron Alexandre MD   Hosp Day # 0 PCP Unknown Pcp       America Portillo Acoustic Stimulator: No           Nonstress Test Interpretation: Reactive           Nonstress Test Second Interpretation: Reactive          FHR Category: Category I           Additional Comments Comments: Reactive nst, labs, vital signs, upcoming bpp

## 2018-05-07 NOTE — PROGRESS NOTES
Headache last night but better today. BP log at home 130-140/80-90s. Reviewed kick counts and PreE precautions.  Sent to [facility] for serial BP and labs  RTC 1 wk

## 2018-05-07 NOTE — TELEPHONE ENCOUNTER
5-2-18 [facility] US report to Dr Malachi Apgar folder and Mirian Landon folder

## 2018-05-07 NOTE — TELEPHONE ENCOUNTER
Pt sent to [facility] for /103 at office visit. Pt currently asymptomatic and her labs are normal with p/c ratio.   Spoke with DINO @ [facility] and since pt has not yet met severe criteria for BP and labs normal and she is asymptomatic that we can still observe h

## 2018-05-10 ENCOUNTER — MED REC SCAN ONLY (OUTPATIENT)
Dept: OBGYN CLINIC | Facility: CLINIC | Age: 38
End: 2018-05-10

## 2018-05-14 ENCOUNTER — APPOINTMENT (OUTPATIENT)
Dept: LAB | Facility: HOSPITAL | Age: 38
End: 2018-05-14
Attending: OBSTETRICS & GYNECOLOGY
Payer: COMMERCIAL

## 2018-05-14 ENCOUNTER — ROUTINE PRENATAL (OUTPATIENT)
Dept: OBGYN CLINIC | Facility: CLINIC | Age: 38
End: 2018-05-14

## 2018-05-14 VITALS
BODY MASS INDEX: 37 KG/M2 | WEIGHT: 165 LBS | HEART RATE: 103 BPM | DIASTOLIC BLOOD PRESSURE: 92 MMHG | SYSTOLIC BLOOD PRESSURE: 147 MMHG

## 2018-05-14 DIAGNOSIS — Z34.93 ENCOUNTER FOR SUPERVISION OF NORMAL PREGNANCY IN THIRD TRIMESTER, UNSPECIFIED GRAVIDITY: ICD-10-CM

## 2018-05-14 DIAGNOSIS — O13.3 PREGNANCY-INDUCED HYPERTENSION IN THIRD TRIMESTER: ICD-10-CM

## 2018-05-14 DIAGNOSIS — Z34.93 ENCOUNTER FOR SUPERVISION OF NORMAL PREGNANCY IN THIRD TRIMESTER, UNSPECIFIED GRAVIDITY: Primary | ICD-10-CM

## 2018-05-14 PROCEDURE — 36415 COLL VENOUS BLD VENIPUNCTURE: CPT

## 2018-05-14 PROCEDURE — 82570 ASSAY OF URINE CREATININE: CPT

## 2018-05-14 PROCEDURE — 81002 URINALYSIS NONAUTO W/O SCOPE: CPT | Performed by: OBSTETRICS & GYNECOLOGY

## 2018-05-14 PROCEDURE — 84156 ASSAY OF PROTEIN URINE: CPT

## 2018-05-14 PROCEDURE — 86780 TREPONEMA PALLIDUM: CPT

## 2018-05-14 PROCEDURE — 87389 HIV-1 AG W/HIV-1&-2 AB AG IA: CPT

## 2018-05-14 PROCEDURE — 80053 COMPREHEN METABOLIC PANEL: CPT

## 2018-05-14 PROCEDURE — 85027 COMPLETE CBC AUTOMATED: CPT

## 2018-05-14 NOTE — PROGRESS NOTES
GBS today. [address] labs today and BPP Wednesday. Reviewed birth plan and father not to be at circ otherwise ok. Reviewed PreE precautions.    Plan for apt next week and IOL next week

## 2018-05-15 ENCOUNTER — TELEPHONE (OUTPATIENT)
Dept: OBGYN CLINIC | Facility: CLINIC | Age: 38
End: 2018-05-15

## 2018-05-16 PROBLEM — B95.1 POSITIVE GBS TEST: Status: ACTIVE | Noted: 2018-05-16

## 2018-05-17 ENCOUNTER — TELEPHONE (OUTPATIENT)
Dept: OBGYN CLINIC | Facility: CLINIC | Age: 38
End: 2018-05-17

## 2018-05-17 ENCOUNTER — ROUTINE PRENATAL (OUTPATIENT)
Dept: OBGYN CLINIC | Facility: CLINIC | Age: 38
End: 2018-05-17

## 2018-05-17 ENCOUNTER — HOSPITAL ENCOUNTER (OUTPATIENT)
Facility: HOSPITAL | Age: 38
Setting detail: OBSERVATION
Discharge: HOME | End: 2018-05-17
Attending: OBSTETRICS & GYNECOLOGY | Admitting: OBSTETRICS & GYNECOLOGY
Payer: COMMERCIAL

## 2018-05-17 VITALS
HEIGHT: 56 IN | WEIGHT: 167 LBS | TEMPERATURE: 98 F | DIASTOLIC BLOOD PRESSURE: 81 MMHG | SYSTOLIC BLOOD PRESSURE: 127 MMHG | HEART RATE: 92 BPM | BODY MASS INDEX: 37.57 KG/M2 | RESPIRATION RATE: 18 BRPM

## 2018-05-17 VITALS
DIASTOLIC BLOOD PRESSURE: 100 MMHG | SYSTOLIC BLOOD PRESSURE: 150 MMHG | BODY MASS INDEX: 37 KG/M2 | HEART RATE: 112 BPM | WEIGHT: 166 LBS

## 2018-05-17 DIAGNOSIS — Z34.93 ENCOUNTER FOR SUPERVISION OF NORMAL PREGNANCY IN THIRD TRIMESTER, UNSPECIFIED GRAVIDITY: Primary | ICD-10-CM

## 2018-05-17 PROBLEM — O13.9 PIH (PREGNANCY INDUCED HYPERTENSION): Status: ACTIVE | Noted: 2018-05-17

## 2018-05-17 PROBLEM — O13.9 PIH (PREGNANCY INDUCED HYPERTENSION) (HCC): Status: ACTIVE | Noted: 2018-05-17

## 2018-05-17 PROCEDURE — 81002 URINALYSIS NONAUTO W/O SCOPE: CPT | Performed by: OBSTETRICS & GYNECOLOGY

## 2018-05-17 PROCEDURE — 59025 FETAL NON-STRESS TEST: CPT | Performed by: OBSTETRICS & GYNECOLOGY

## 2018-05-17 NOTE — TELEPHONE ENCOUNTER
C/O BP /90 A SHORT WHILE AGO. STATES HER DIASTOLIC'S ON THE 34LY WERE ALL 99. DENIES ANY HEADACHE, VISUAL CHANGES/DISTURBANCES AND NO RIGHT EPIGASTRIC PAIN. DID HAVE SOME LEFT EPIGASTRIC PAIN YESTERDAY BUT RESOLVED.   ADVISED TO BE SEEN BY MD TODAY

## 2018-05-17 NOTE — TRIAGE
[facility]      Triage Note    Clarisa Holly Patient Status:  Observation    11/3/1980 MRN [id number]   Location [address]  Attending Tania Berry MD   Hosp Day # 0 PCP Unknown Pcp       Shae Almazan Acoustic Stimulator: No           Nonstress Test Interpretation: Reactive           Nonstress Test Second Interpretation: Reactive          FHR Category: Category I           Additional Comments       Reason for visit: Increased BP's.   Notified Dr Flavio Kwon

## 2018-05-17 NOTE — PROGRESS NOTES
Problem visit. Seen by PCP for hypothyroid. BP elevated. No symptoms. Had BPP at [facility] yesterday. To triage for monitoring and labs.

## 2018-05-21 ENCOUNTER — ROUTINE PRENATAL (OUTPATIENT)
Dept: OBGYN CLINIC | Facility: CLINIC | Age: 38
End: 2018-05-21

## 2018-05-21 ENCOUNTER — TELEPHONE (OUTPATIENT)
Dept: OBGYN CLINIC | Facility: CLINIC | Age: 38
End: 2018-05-21

## 2018-05-21 ENCOUNTER — HOSPITAL ENCOUNTER (OUTPATIENT)
Facility: HOSPITAL | Age: 38
Setting detail: OBSERVATION
Discharge: HOME | End: 2018-05-21
Attending: OBSTETRICS & GYNECOLOGY | Admitting: OBSTETRICS & GYNECOLOGY
Payer: COMMERCIAL

## 2018-05-21 VITALS — DIASTOLIC BLOOD PRESSURE: 81 MMHG | HEART RATE: 88 BPM | SYSTOLIC BLOOD PRESSURE: 136 MMHG | RESPIRATION RATE: 19 BRPM

## 2018-05-21 VITALS
BODY MASS INDEX: 38 KG/M2 | DIASTOLIC BLOOD PRESSURE: 112 MMHG | HEART RATE: 111 BPM | SYSTOLIC BLOOD PRESSURE: 161 MMHG | WEIGHT: 168 LBS

## 2018-05-21 DIAGNOSIS — Z34.93 ENCOUNTER FOR SUPERVISION OF NORMAL PREGNANCY IN THIRD TRIMESTER, UNSPECIFIED GRAVIDITY: Primary | ICD-10-CM

## 2018-05-21 PROCEDURE — 81002 URINALYSIS NONAUTO W/O SCOPE: CPT | Performed by: OBSTETRICS & GYNECOLOGY

## 2018-05-21 NOTE — PROGRESS NOTES
Spoke with Dr Gilberto Rogers at [facility]  Since BP in triage 130/90 and normal labs, pt may go home and follow original plan of returning tomorrow night for IOL  Pre eclampsia precautions and labor precautions reviewed  Pt and  prefer to go home and start IOL marlyn

## 2018-05-21 NOTE — PROGRESS NOTES
Pt has c/o sudden left sided back pain. Pt turned from sitting to lying on her right side. Pt has tenderness on her left side.

## 2018-05-21 NOTE — TRIAGE
[facility]      Triage Note    Jennifer Banda Patient Status:  Observation    11/3/1980 MRN [id number]   Location [address]  Attending Nicholas Mckee MD   Hosp Day # 0 PCP Unknown Pcp       Alexander Hunter Para: Patsy Yepez Contractions: Not present                       Acoustic Stimulator: No           Nonstress Test Interpretation: Reactive           Nonstress Test Second Interpretation: Reactive          FHR Category: Category I           Additional Comments Comment

## 2018-05-21 NOTE — TELEPHONE ENCOUNTER
[facility] genetic counselor note dated 1/16/18 signed by Ramos Meza and sent to scanning.

## 2018-05-21 NOTE — PROGRESS NOTES
BP elevated in office today. Didn't bring BP results with her. To [facility] for Labs and monitoring. IOL scheduled tomorrow night for gHTN if labs/BP okay today. KRISTIAN on call notified. All questions answered.

## 2018-05-22 ENCOUNTER — HOSPITAL ENCOUNTER (INPATIENT)
Facility: HOSPITAL | Age: 38
LOS: 7 days | Discharge: HOME | End: 2018-05-29
Attending: OBSTETRICS & GYNECOLOGY | Admitting: OBSTETRICS & GYNECOLOGY
Payer: COMMERCIAL

## 2018-05-22 ENCOUNTER — HOSPITAL ENCOUNTER (INPATIENT)
Dept: OBGYN CLINIC | Facility: HOSPITAL | Age: 38
Discharge: HOME | End: 2018-05-22
Payer: COMMERCIAL

## 2018-05-22 PROCEDURE — 3E033VJ INTRODUCTION OF OTHER HORMONE INTO PERIPHERAL VEIN, PERCUTANEOUS APPROACH: ICD-10-PCS | Performed by: OBSTETRICS & GYNECOLOGY

## 2018-05-22 PROCEDURE — 59200 INSERT CERVICAL DILATOR: CPT | Performed by: OBSTETRICS & GYNECOLOGY

## 2018-05-22 RX ORDER — DEXTROSE, SODIUM CHLORIDE, SODIUM LACTATE, POTASSIUM CHLORIDE, AND CALCIUM CHLORIDE 5; .6; .31; .03; .02 G/100ML; G/100ML; G/100ML; G/100ML; G/100ML
125 INJECTION, SOLUTION INTRAVENOUS CONTINUOUS
Status: DISCONTINUED | OUTPATIENT
Start: 2018-05-22 | End: 2018-05-25

## 2018-05-22 RX ORDER — ONDANSETRON 2 MG/ML
4 INJECTION INTRAMUSCULAR; INTRAVENOUS EVERY 6 HOURS PRN
Status: DISCONTINUED | OUTPATIENT
Start: 2018-05-22 | End: 2018-05-25

## 2018-05-22 RX ORDER — ACETAMINOPHEN 500 MG
1000 TABLET ORAL ONCE AS NEEDED
Status: DISCONTINUED | OUTPATIENT
Start: 2018-05-22 | End: 2018-05-25

## 2018-05-22 RX ORDER — LIDOCAINE HYDROCHLORIDE 10 MG/ML
30 INJECTION, SOLUTION EPIDURAL; INFILTRATION; INTRACAUDAL; PERINEURAL ONCE
Status: DISCONTINUED | OUTPATIENT
Start: 2018-05-22 | End: 2018-05-25

## 2018-05-22 RX ORDER — SODIUM CHLORIDE 0.9 % (FLUSH) 0.9 %
10 SYRINGE (ML) INJECTION AS NEEDED
Status: DISCONTINUED | OUTPATIENT
Start: 2018-05-22 | End: 2018-05-25

## 2018-05-22 RX ORDER — HYDROXYZINE HYDROCHLORIDE 50 MG/ML
50 INJECTION, SOLUTION INTRAMUSCULAR ONCE AS NEEDED
Status: ACTIVE | OUTPATIENT
Start: 2018-05-22 | End: 2018-05-22

## 2018-05-22 RX ORDER — NALBUPHINE HCL 10 MG/ML
10 AMPUL (ML) INJECTION ONCE
Status: COMPLETED | OUTPATIENT
Start: 2018-05-22 | End: 2018-05-24

## 2018-05-22 RX ORDER — AMMONIA INHALANTS 0.04 G/.3ML
0.3 INHALANT RESPIRATORY (INHALATION) AS NEEDED
Status: DISCONTINUED | OUTPATIENT
Start: 2018-05-22 | End: 2018-05-25

## 2018-05-22 RX ORDER — TERBUTALINE SULFATE 1 MG/ML
0.25 INJECTION, SOLUTION SUBCUTANEOUS AS NEEDED
Status: DISCONTINUED | OUTPATIENT
Start: 2018-05-22 | End: 2018-05-25

## 2018-05-22 RX ORDER — TRISODIUM CITRATE DIHYDRATE AND CITRIC ACID MONOHYDRATE 500; 334 MG/5ML; MG/5ML
30 SOLUTION ORAL AS NEEDED
Status: DISCONTINUED | OUTPATIENT
Start: 2018-05-22 | End: 2018-05-25

## 2018-05-22 RX ORDER — RANITIDINE 150 MG/1
150 CAPSULE ORAL DAILY PRN
Status: ON HOLD | COMMUNITY
End: 2018-06-05

## 2018-05-22 RX ORDER — DEXTROSE, SODIUM CHLORIDE, SODIUM LACTATE, POTASSIUM CHLORIDE, AND CALCIUM CHLORIDE 5; .6; .31; .03; .02 G/100ML; G/100ML; G/100ML; G/100ML; G/100ML
INJECTION, SOLUTION INTRAVENOUS
Status: COMPLETED
Start: 2018-05-22 | End: 2018-05-23

## 2018-05-22 RX ORDER — CLINDAMYCIN PHOSPHATE 900 MG/50ML
900 INJECTION INTRAVENOUS EVERY 8 HOURS
Status: DISCONTINUED | OUTPATIENT
Start: 2018-05-22 | End: 2018-05-25

## 2018-05-23 PROCEDURE — 3E0P7VZ INTRODUCTION OF HORMONE INTO FEMALE REPRODUCTIVE, VIA NATURAL OR ARTIFICIAL OPENING: ICD-10-PCS | Performed by: OBSTETRICS & GYNECOLOGY

## 2018-05-23 RX ORDER — HYDROXYZINE HYDROCHLORIDE 50 MG/ML
50 INJECTION, SOLUTION INTRAMUSCULAR ONCE AS NEEDED
Status: ACTIVE | OUTPATIENT
Start: 2018-05-23 | End: 2018-05-23

## 2018-05-23 NOTE — PLAN OF CARE
Pt IOL progressing. Pt feeling back pain with contractions. Squatting, forward leaning and open pelvis positions discussed. Pt reports coping well at this time with contraction strength and intensity.  Encouraged pt to ambulate frequently and be in upright

## 2018-05-23 NOTE — PROGRESS NOTES
Report from [place]. Pt resting in bed-left lateral. c/o low back pain with contractions. Coping well at this time.  at bedside.

## 2018-05-23 NOTE — PLAN OF CARE
Dr Sherlyn Navarrete notified of VD. Order to check cervix at [address] and notify MD for additional orders. Likely cytotec overnight if no cervical change. Pt and  updated.   BIRTH - VAGINAL/ SECTION    • Fetal and maternal status remain reassuring pamela

## 2018-05-23 NOTE — H&P
[facility]    PATIENT'S NAME: Jenni Freitas   ATTENDING PHYSICIAN: Shameka Grijalva.  DO Lucy   PATIENT ACCOUNT#:   [de-identified]    LOCATION:  [address] [place] [address] #:   [id number]       YOB: 1980  ADMISSION DATE:       0 medication through her [facility] physician. She has congenital adrenal hyperplasia. PAST SURGICAL HISTORY:  D and C, and a herniorrhaphy. MEDICATIONS:  Prenatal vitamins 1 daily and then levothyroxine 25 mcg p.o. on Monday, Wednesday, and Friday weekly.

## 2018-05-24 RX ORDER — NALBUPHINE HCL 10 MG/ML
AMPUL (ML) INJECTION
Status: COMPLETED
Start: 2018-05-24 | End: 2018-05-24

## 2018-05-24 RX ORDER — LABETALOL HYDROCHLORIDE 5 MG/ML
INJECTION, SOLUTION INTRAVENOUS
Status: COMPLETED
Start: 2018-05-24 | End: 2018-05-24

## 2018-05-24 RX ORDER — LABETALOL HYDROCHLORIDE 5 MG/ML
20 INJECTION, SOLUTION INTRAVENOUS
Status: DISCONTINUED | OUTPATIENT
Start: 2018-05-24 | End: 2018-05-25

## 2018-05-24 RX ORDER — HYDROXYZINE HYDROCHLORIDE 50 MG/ML
50 INJECTION, SOLUTION INTRAMUSCULAR ONCE
Status: COMPLETED | OUTPATIENT
Start: 2018-05-24 | End: 2018-05-24

## 2018-05-24 RX ORDER — HYDRALAZINE HYDROCHLORIDE 20 MG/ML
INJECTION INTRAMUSCULAR; INTRAVENOUS
Status: DISCONTINUED | OUTPATIENT
Start: 2018-05-24 | End: 2018-05-25

## 2018-05-24 RX ORDER — CALCIUM GLUCONATE 94 MG/ML
1 INJECTION, SOLUTION INTRAVENOUS ONCE
Status: DISCONTINUED | OUTPATIENT
Start: 2018-05-24 | End: 2018-05-25

## 2018-05-24 RX ORDER — SCOLOPAMINE TRANSDERMAL SYSTEM 1 MG/1
1 PATCH, EXTENDED RELEASE TRANSDERMAL
Status: DISCONTINUED | OUTPATIENT
Start: 2018-05-24 | End: 2018-05-25

## 2018-05-24 NOTE — PLAN OF CARE
Pt feeling cramping/back pain with irregular contractions. States she is coping well at this time. Pt not tolerating PO fluids. Pt has vomited x1 after ambulating to bathroom. Using Zofran with some relief.  Pt will notify RN if she is needing additional in

## 2018-05-24 NOTE — PROGRESS NOTES
5/24/2018, 12:22 PM    Subjective:  Patient seen earlier this morning  Noted occ n/v with cx exams and with walking  Denies ha, blurry vision, cp, sob, ruq pain    Objective:   05/24/18  0815 05/24/18  1145 05/24/18  1200 05/24/18  1215   BP: 140/78 (!) 16

## 2018-05-24 NOTE — PROGRESS NOTES
Report received from [facility] [employer] [facility]. Pt resting in bed. Feeling back labor w/irregular contractions. Intermittent nausea. Declines medication at this time.  at bedside.

## 2018-05-25 ENCOUNTER — ANESTHESIA EVENT (OUTPATIENT)
Dept: OBGYN UNIT | Facility: HOSPITAL | Age: 38
End: 2018-05-25
Payer: COMMERCIAL

## 2018-05-25 ENCOUNTER — SURGERY (OUTPATIENT)
Age: 38
End: 2018-05-25

## 2018-05-25 ENCOUNTER — ANESTHESIA (OUTPATIENT)
Dept: OBGYN UNIT | Facility: HOSPITAL | Age: 38
End: 2018-05-25
Payer: COMMERCIAL

## 2018-05-25 PROBLEM — Z98.891 STATUS POST PRIMARY LOW TRANSVERSE CESAREAN SECTION: Status: ACTIVE | Noted: 2018-05-25

## 2018-05-25 PROCEDURE — 59510 CESAREAN DELIVERY: CPT | Performed by: OBSTETRICS & GYNECOLOGY

## 2018-05-25 RX ORDER — NALOXONE HYDROCHLORIDE 0.4 MG/ML
0.08 INJECTION, SOLUTION INTRAMUSCULAR; INTRAVENOUS; SUBCUTANEOUS
Status: ACTIVE | OUTPATIENT
Start: 2018-05-25 | End: 2018-05-26

## 2018-05-25 RX ORDER — HEPARIN SODIUM 5000 [USP'U]/ML
5000 INJECTION, SOLUTION INTRAVENOUS; SUBCUTANEOUS EVERY 8 HOURS
Status: DISCONTINUED | OUTPATIENT
Start: 2018-05-25 | End: 2018-05-25

## 2018-05-25 RX ORDER — DOCUSATE SODIUM 100 MG/1
100 CAPSULE, LIQUID FILLED ORAL
Status: DISCONTINUED | OUTPATIENT
Start: 2018-05-25 | End: 2018-05-27

## 2018-05-25 RX ORDER — LIDOCAINE HYDROCHLORIDE AND EPINEPHRINE 15; 5 MG/ML; UG/ML
INJECTION, SOLUTION EPIDURAL AS NEEDED
Status: DISCONTINUED | OUTPATIENT
Start: 2018-05-25 | End: 2018-05-25 | Stop reason: SURG

## 2018-05-25 RX ORDER — LIDOCAINE HYDROCHLORIDE AND EPINEPHRINE 20; 5 MG/ML; UG/ML
INJECTION, SOLUTION EPIDURAL; INFILTRATION; INTRACAUDAL; PERINEURAL
Status: COMPLETED
Start: 2018-05-25 | End: 2018-05-25

## 2018-05-25 RX ORDER — BUPIVACAINE HYDROCHLORIDE 2.5 MG/ML
INJECTION, SOLUTION EPIDURAL; INFILTRATION; INTRACAUDAL
Status: COMPLETED
Start: 2018-05-25 | End: 2018-05-25

## 2018-05-25 RX ORDER — MORPHINE SULFATE 1 MG/ML
INJECTION, SOLUTION EPIDURAL; INTRATHECAL; INTRAVENOUS AS NEEDED
Status: DISCONTINUED | OUTPATIENT
Start: 2018-05-25 | End: 2018-05-25 | Stop reason: SURG

## 2018-05-25 RX ORDER — LEVOTHYROXINE SODIUM 0.03 MG/1
25 TABLET ORAL
Status: DISCONTINUED | OUTPATIENT
Start: 2018-05-25 | End: 2018-05-29

## 2018-05-25 RX ORDER — LIDOCAINE HYDROCHLORIDE AND EPINEPHRINE 20; 5 MG/ML; UG/ML
INJECTION, SOLUTION EPIDURAL; INFILTRATION; INTRACAUDAL; PERINEURAL AS NEEDED
Status: DISCONTINUED | OUTPATIENT
Start: 2018-05-25 | End: 2018-05-25 | Stop reason: SURG

## 2018-05-25 RX ORDER — HYDROMORPHONE HYDROCHLORIDE 1 MG/ML
0.6 INJECTION, SOLUTION INTRAMUSCULAR; INTRAVENOUS; SUBCUTANEOUS
Status: ACTIVE | OUTPATIENT
Start: 2018-05-25 | End: 2018-05-26

## 2018-05-25 RX ORDER — ONDANSETRON 2 MG/ML
4 INJECTION INTRAMUSCULAR; INTRAVENOUS ONCE AS NEEDED
Status: ACTIVE | OUTPATIENT
Start: 2018-05-25 | End: 2018-05-25

## 2018-05-25 RX ORDER — CEFAZOLIN SODIUM/WATER 2 G/20 ML
SYRINGE (ML) INTRAVENOUS
Status: DISCONTINUED
Start: 2018-05-25 | End: 2018-05-25 | Stop reason: WASHOUT

## 2018-05-25 RX ORDER — LIDOCAINE HYDROCHLORIDE 10 MG/ML
INJECTION, SOLUTION EPIDURAL; INFILTRATION; INTRACAUDAL; PERINEURAL AS NEEDED
Status: DISCONTINUED | OUTPATIENT
Start: 2018-05-25 | End: 2018-05-25 | Stop reason: SURG

## 2018-05-25 RX ORDER — POLYETHYLENE GLYCOL 3350 17 G/17G
17 POWDER, FOR SOLUTION ORAL DAILY PRN
Status: DISCONTINUED | OUTPATIENT
Start: 2018-05-25 | End: 2018-05-29

## 2018-05-25 RX ORDER — HALOPERIDOL 5 MG/ML
0.5 INJECTION INTRAMUSCULAR ONCE AS NEEDED
Status: ACTIVE | OUTPATIENT
Start: 2018-05-25 | End: 2018-05-25

## 2018-05-25 RX ORDER — SIMETHICONE 80 MG
80 TABLET,CHEWABLE ORAL 3 TIMES DAILY PRN
Status: DISCONTINUED | OUTPATIENT
Start: 2018-05-25 | End: 2018-05-29

## 2018-05-25 RX ORDER — DIPHENHYDRAMINE HCL 25 MG
25 CAPSULE ORAL EVERY 4 HOURS PRN
Status: ACTIVE | OUTPATIENT
Start: 2018-05-25 | End: 2018-05-26

## 2018-05-25 RX ORDER — SODIUM PHOSPHATE, DIBASIC AND SODIUM PHOSPHATE, MONOBASIC 7; 19 G/133ML; G/133ML
1 ENEMA RECTAL ONCE AS NEEDED
Status: DISCONTINUED | OUTPATIENT
Start: 2018-05-25 | End: 2018-05-29

## 2018-05-25 RX ORDER — AMMONIA INHALANTS 0.04 G/.3ML
0.3 INHALANT RESPIRATORY (INHALATION) AS NEEDED
Status: DISCONTINUED | OUTPATIENT
Start: 2018-05-25 | End: 2018-05-29

## 2018-05-25 RX ORDER — ONDANSETRON 2 MG/ML
4 INJECTION INTRAMUSCULAR; INTRAVENOUS EVERY 6 HOURS PRN
Status: DISCONTINUED | OUTPATIENT
Start: 2018-05-25 | End: 2018-05-29

## 2018-05-25 RX ORDER — BUPIVACAINE HYDROCHLORIDE 2.5 MG/ML
INJECTION, SOLUTION EPIDURAL; INFILTRATION; INTRACAUDAL AS NEEDED
Status: DISCONTINUED | OUTPATIENT
Start: 2018-05-25 | End: 2018-05-25 | Stop reason: SURG

## 2018-05-25 RX ORDER — HYDROCODONE BITARTRATE AND ACETAMINOPHEN 7.5; 325 MG/1; MG/1
1 TABLET ORAL EVERY 6 HOURS PRN
Status: DISCONTINUED | OUTPATIENT
Start: 2018-05-25 | End: 2018-05-25

## 2018-05-25 RX ORDER — KETOROLAC TROMETHAMINE 30 MG/ML
30 INJECTION, SOLUTION INTRAMUSCULAR; INTRAVENOUS EVERY 6 HOURS
Status: DISCONTINUED | OUTPATIENT
Start: 2018-05-25 | End: 2018-05-27 | Stop reason: ALTCHOICE

## 2018-05-25 RX ORDER — ENOXAPARIN SODIUM 100 MG/ML
40 INJECTION SUBCUTANEOUS DAILY
Status: DISCONTINUED | OUTPATIENT
Start: 2018-05-26 | End: 2018-05-25

## 2018-05-25 RX ORDER — NALBUPHINE HCL 10 MG/ML
2.5 AMPUL (ML) INJECTION
Status: DISCONTINUED | OUTPATIENT
Start: 2018-05-25 | End: 2018-05-25

## 2018-05-25 RX ORDER — DEXTROSE, SODIUM CHLORIDE, SODIUM LACTATE, POTASSIUM CHLORIDE, AND CALCIUM CHLORIDE 5; .6; .31; .03; .02 G/100ML; G/100ML; G/100ML; G/100ML; G/100ML
INJECTION, SOLUTION INTRAVENOUS CONTINUOUS
Status: DISCONTINUED | OUTPATIENT
Start: 2018-05-25 | End: 2018-05-29

## 2018-05-25 RX ORDER — PRENATAL VIT,CAL 76/IRON/FOLIC 29 MG-1 MG
1 TABLET ORAL DAILY
Status: DISCONTINUED | OUTPATIENT
Start: 2018-05-25 | End: 2018-05-29

## 2018-05-25 RX ORDER — DIPHENHYDRAMINE HYDROCHLORIDE 50 MG/ML
12.5 INJECTION INTRAMUSCULAR; INTRAVENOUS EVERY 4 HOURS PRN
Status: ACTIVE | OUTPATIENT
Start: 2018-05-25 | End: 2018-05-26

## 2018-05-25 RX ORDER — BISACODYL 10 MG
10 SUPPOSITORY, RECTAL RECTAL
Status: DISCONTINUED | OUTPATIENT
Start: 2018-05-25 | End: 2018-05-29

## 2018-05-25 RX ORDER — SODIUM CHLORIDE, SODIUM LACTATE, POTASSIUM CHLORIDE, CALCIUM CHLORIDE 600; 310; 30; 20 MG/100ML; MG/100ML; MG/100ML; MG/100ML
INJECTION, SOLUTION INTRAVENOUS CONTINUOUS PRN
Status: DISCONTINUED | OUTPATIENT
Start: 2018-05-25 | End: 2018-05-25 | Stop reason: SURG

## 2018-05-25 RX ORDER — HYDROCODONE BITARTRATE AND ACETAMINOPHEN 7.5; 325 MG/1; MG/1
2 TABLET ORAL EVERY 6 HOURS PRN
Status: DISCONTINUED | OUTPATIENT
Start: 2018-05-25 | End: 2018-05-25

## 2018-05-25 RX ORDER — CLINDAMYCIN PHOSPHATE 600 MG/50ML
600 INJECTION INTRAVENOUS EVERY 8 HOURS
Status: DISPENSED | OUTPATIENT
Start: 2018-05-25 | End: 2018-05-26

## 2018-05-25 RX ORDER — SODIUM CHLORIDE 0.9 % (FLUSH) 0.9 %
10 SYRINGE (ML) INJECTION AS NEEDED
Status: DISCONTINUED | OUTPATIENT
Start: 2018-05-25 | End: 2018-05-29

## 2018-05-25 RX ORDER — ONDANSETRON 2 MG/ML
INJECTION INTRAMUSCULAR; INTRAVENOUS AS NEEDED
Status: DISCONTINUED | OUTPATIENT
Start: 2018-05-25 | End: 2018-05-25 | Stop reason: SURG

## 2018-05-25 RX ORDER — HYDROMORPHONE HYDROCHLORIDE 1 MG/ML
0.4 INJECTION, SOLUTION INTRAMUSCULAR; INTRAVENOUS; SUBCUTANEOUS
Status: ACTIVE | OUTPATIENT
Start: 2018-05-25 | End: 2018-05-26

## 2018-05-25 RX ORDER — SODIUM CHLORIDE, SODIUM LACTATE, POTASSIUM CHLORIDE, CALCIUM CHLORIDE 600; 310; 30; 20 MG/100ML; MG/100ML; MG/100ML; MG/100ML
INJECTION, SOLUTION INTRAVENOUS
Status: COMPLETED
Start: 2018-05-25 | End: 2018-05-25

## 2018-05-25 RX ORDER — NALBUPHINE HCL 10 MG/ML
10 AMPUL (ML) INJECTION ONCE
Status: COMPLETED | OUTPATIENT
Start: 2018-05-25 | End: 2018-05-25

## 2018-05-25 RX ORDER — ENOXAPARIN SODIUM 100 MG/ML
40 INJECTION SUBCUTANEOUS EVERY 24 HOURS
Status: DISCONTINUED | OUTPATIENT
Start: 2018-05-26 | End: 2018-05-29

## 2018-05-25 RX ORDER — HYDROXYZINE HYDROCHLORIDE 50 MG/ML
50 INJECTION, SOLUTION INTRAMUSCULAR ONCE
Status: COMPLETED | OUTPATIENT
Start: 2018-05-25 | End: 2018-05-25

## 2018-05-25 RX ORDER — EPHEDRINE SULFATE/0.9% NACL/PF 25 MG/5 ML
SYRINGE (ML) INTRAVENOUS
Status: COMPLETED
Start: 2018-05-25 | End: 2018-05-25

## 2018-05-25 RX ORDER — NALBUPHINE HCL 10 MG/ML
2.5 AMPUL (ML) INJECTION EVERY 4 HOURS PRN
Status: ACTIVE | OUTPATIENT
Start: 2018-05-25 | End: 2018-05-26

## 2018-05-25 RX ORDER — EPHEDRINE SULFATE/0.9% NACL/PF 25 MG/5 ML
10 SYRINGE (ML) INTRAVENOUS AS NEEDED
Status: DISCONTINUED | OUTPATIENT
Start: 2018-05-25 | End: 2018-05-25

## 2018-05-25 RX ORDER — HYDROCODONE BITARTRATE AND ACETAMINOPHEN 5; 325 MG/1; MG/1
1-2 TABLET ORAL EVERY 6 HOURS PRN
Status: DISCONTINUED | OUTPATIENT
Start: 2018-05-25 | End: 2018-05-29

## 2018-05-25 RX ORDER — FAMOTIDINE 10 MG/ML
20 INJECTION, SOLUTION INTRAVENOUS 2 TIMES DAILY
Status: DISCONTINUED | OUTPATIENT
Start: 2018-05-25 | End: 2018-05-27

## 2018-05-25 RX ORDER — DEXAMETHASONE SODIUM PHOSPHATE 4 MG/ML
VIAL (ML) INJECTION AS NEEDED
Status: DISCONTINUED | OUTPATIENT
Start: 2018-05-25 | End: 2018-05-25 | Stop reason: SURG

## 2018-05-25 RX ORDER — ACETAMINOPHEN 325 MG/1
650 TABLET ORAL EVERY 6 HOURS PRN
Status: ACTIVE | OUTPATIENT
Start: 2018-05-25 | End: 2018-05-26

## 2018-05-25 RX ORDER — EPHEDRINE SULFATE/0.9% NACL/PF 25 MG/5 ML
5 SYRINGE (ML) INTRAVENOUS AS NEEDED
Status: DISCONTINUED | OUTPATIENT
Start: 2018-05-25 | End: 2018-05-25

## 2018-05-25 RX ORDER — CLINDAMYCIN PHOSPHATE 900 MG/50ML
900 INJECTION INTRAVENOUS ONCE
Status: DISCONTINUED | OUTPATIENT
Start: 2018-05-25 | End: 2018-05-25

## 2018-05-25 RX ORDER — EPHEDRINE SULFATE 50 MG/ML
INJECTION, SOLUTION INTRAVENOUS AS NEEDED
Status: DISCONTINUED | OUTPATIENT
Start: 2018-05-25 | End: 2018-05-25 | Stop reason: SURG

## 2018-05-25 RX ORDER — METOCLOPRAMIDE HYDROCHLORIDE 5 MG/ML
10 INJECTION INTRAMUSCULAR; INTRAVENOUS EVERY 6 HOURS PRN
Status: DISCONTINUED | OUTPATIENT
Start: 2018-05-25 | End: 2018-05-29

## 2018-05-25 RX ADMIN — ONDANSETRON 4 MG: 2 INJECTION INTRAMUSCULAR; INTRAVENOUS at 18:00:00

## 2018-05-25 RX ADMIN — BUPIVACAINE HYDROCHLORIDE 8 ML: 2.5 INJECTION, SOLUTION EPIDURAL; INFILTRATION; INTRACAUDAL at 07:22:00

## 2018-05-25 RX ADMIN — LIDOCAINE HYDROCHLORIDE AND EPINEPHRINE 3 ML: 15; 5 INJECTION, SOLUTION EPIDURAL at 07:20:00

## 2018-05-25 RX ADMIN — EPHEDRINE SULFATE 5 MG: 50 INJECTION, SOLUTION INTRAVENOUS at 18:35:00

## 2018-05-25 RX ADMIN — MORPHINE SULFATE 2 MG: 1 INJECTION, SOLUTION EPIDURAL; INTRATHECAL; INTRAVENOUS at 18:15:00

## 2018-05-25 RX ADMIN — SODIUM CHLORIDE, SODIUM LACTATE, POTASSIUM CHLORIDE, CALCIUM CHLORIDE: 600; 310; 30; 20 INJECTION, SOLUTION INTRAVENOUS at 18:45:00

## 2018-05-25 RX ADMIN — LIDOCAINE HYDROCHLORIDE 1 ML: 10 INJECTION, SOLUTION EPIDURAL; INFILTRATION; INTRACAUDAL; PERINEURAL at 07:17:00

## 2018-05-25 RX ADMIN — EPHEDRINE SULFATE 5 MG: 50 INJECTION, SOLUTION INTRAVENOUS at 18:10:00

## 2018-05-25 RX ADMIN — LIDOCAINE HYDROCHLORIDE AND EPINEPHRINE 3 ML: 20; 5 INJECTION, SOLUTION EPIDURAL; INFILTRATION; INTRACAUDAL; PERINEURAL at 18:20:00

## 2018-05-25 RX ADMIN — DEXAMETHASONE SODIUM PHOSPHATE 4 MG: 4 MG/ML VIAL (ML) INJECTION at 18:00:00

## 2018-05-25 RX ADMIN — LIDOCAINE HYDROCHLORIDE AND EPINEPHRINE 10 ML: 20; 5 INJECTION, SOLUTION EPIDURAL; INFILTRATION; INTRACAUDAL; PERINEURAL at 17:55:00

## 2018-05-25 RX ADMIN — SODIUM CHLORIDE, SODIUM LACTATE, POTASSIUM CHLORIDE, CALCIUM CHLORIDE: 600; 310; 30; 20 INJECTION, SOLUTION INTRAVENOUS at 17:55:00

## 2018-05-25 NOTE — PROGRESS NOTES
10cc sterile water added to uterine balloon per Dr. Sumanth Carr instructions. Traction applied to catheter.

## 2018-05-25 NOTE — ANESTHESIA PROCEDURE NOTES
Labor Analgesia  Performed by: Maurizio Haque  Authorized by: Maurizio Haque     Patient Location:  OB  Start Time:  5/25/2018 7:14 AM  End Time:  5/25/2018 7:25 AM  Site Identification: surface landmarks    Reason for Block: labor epidural    Anesthesiologi

## 2018-05-25 NOTE — PROGRESS NOTES
Stressed the importance with the patient of laying on her side as baby is having late decelerations whenever she is laying on her back. Despite numerous reminders to lay on her sides she continues to lay on her back, also discussed this with the .  MANUELA

## 2018-05-25 NOTE — PROGRESS NOTES
[facility]    Labor Progress Note    Lex Brown Patient Status:  Inpatient    11/3/1980 MRN [id number]   Location [address]  Attending Emilia Mackey MD   [facility] Day # 2 PCP Unknown Pcp       Sumanth Hodge discussed with patient who verbalizes understanding and agreement.     Sinda Host  5/24/2018

## 2018-05-25 NOTE — PROGRESS NOTES
Cooks catheter inserted by Dr. Oliverio Campos. 30 cc in uterine balloon. Patient tolerated procedure well.

## 2018-05-25 NOTE — PROGRESS NOTES
[facility]    Labor Progress Note    Shania Suggs Patient Status:  Inpatient    11/3/1980 MRN [id number]   Location [address]  Attending Jenaro Alejandre MD   [facility] Day # 3 PCP Unknown Pcp       Anne Diamond 05/21/2018   PROUR Negative 04/05/2018   UROBILINOGEN <2.0 04/05/2018   NITRITE NEG 05/21/2018   LEUUR Negative 04/05/2018                 Assessment/Plan   IUP at 37w2d withActive phase labor. Plan for expectant management -- tight arch.  Would labor down

## 2018-05-25 NOTE — PROGRESS NOTES
Patient complete at 1300. At that time patient zero station and moderate amount of caput. Pt started to push about 1530 and at that time found to have anterior lip and zero station. Head had turned and no caput (OA).  Increased pitocin and checked patient a

## 2018-05-25 NOTE — ANESTHESIA PREPROCEDURE EVALUATION
Anesthesia PreOp Note    HPI:     Ant Duron is a 42year old female who presents for preoperative consultation requested by: * No surgeons listed *    Date of Surgery: 5/25/2018    * No procedures listed *  Indication: * No pre-op diagnosis ent Epic:  lidocaine-EPINEPHrine 2 %-1:032930 injection         fentaNYL citrate (SUBLIMAZE) 0.05 MG/ML injection         EPHEDrine sulfate in NaCl 0.9% (PF) 25-0.9 MG/5ML-% injection         lactated ringers infusion         Lidocaine HCl (PF) (XYLOCAINE) 1 % PRN Mook Long DO     clindamycin (CLEOCIN) in D5W 900mg/50ml premix 900 mg Intravenous Q8H Mook Long DO Last Rate: 100 mL/hr at 05/25/18 0339 900 mg at 05/25/18 0339   ondansetron HCl (ZOFRAN) injection 4 mg 4 mg Intravenous Q6H PRN Mercedes 14.    05/25/18  0530 05/25/18  0545 05/25/18  0600 05/25/18  0700   BP: (!) 163/91 142/73 145/78    BP Location:       Pulse: 63 62 59    Resp:       Temp:    97.9 °F (36.6 °C)   TempSrc:    Oral        Anesthesia ROS/Med Hx and Physical Exam     Patient

## 2018-05-26 RX ORDER — 0.9 % SODIUM CHLORIDE 0.9 %
VIAL (ML) INJECTION
Status: DISPENSED
Start: 2018-05-26 | End: 2018-05-26

## 2018-05-26 RX ORDER — 0.9 % SODIUM CHLORIDE 0.9 %
VIAL (ML) INJECTION
Status: DISPENSED
Start: 2018-05-26 | End: 2018-05-27

## 2018-05-26 NOTE — PROGRESS NOTES
Post-Partum Note   5/26/2018, 12:21 PM    Subjective:  Patient doing well. Her pain is well controlled. She feels good. Her sherman was removed this am. She denies N/V. She has not yet ambulated.      Objective:   05/25/18  2330 05/26/18  0130 05/26/18  1998

## 2018-05-26 NOTE — LACTATION NOTE
LACTATION NOTE - MOTHER      Evaluation Type: Inpatient    Problems identified  Problems identified: Knowledge deficit    Maternal history  Maternal history: AMA;Obesity; Infertility; Hypothyroid;PIH  Other/comment: congential adrenal hyperplasia    Breastfe

## 2018-05-26 NOTE — ANESTHESIA POSTPROCEDURE EVALUATION
Patient: Linnea Means    Procedure Summary     Date:  18 Room / Location:  [facility] L+D OR    Anesthesia Start:  531 Anesthesia Stop:      Procedure:   SECTION (N/A Abdomen) Diagnosis:  (same as pre-op)    Surgeon:  Sai Hancock,

## 2018-05-26 NOTE — PLAN OF CARE
ANXIETY    • Will report anxiety at manageable levels  Pt appears calm , anxiety noted or reported Progressing        BIRTH - VAGINAL/ SECTION    • Fetal and maternal status remain reassuring during the birth process g        GASTROINTESTINAL - BIBIANA

## 2018-05-26 NOTE — OPERATIVE REPORT
Operative Report for  Section:    Date: 18  Patient: Char Cole  : 11/3/1980  MRN: [id number]    Preoperative Diagnosis: Intrauterine Pregnancy 37w2d, failure to progress  Postoperative Diagnosis: same  Procedure(s):  Se inferior aspect of this incision which, in a similar fashion, was grasped, tented up with the Kocher clamps, and the rectus muscles dissected off. The rectus muscles were then  in the midline and the peritoneum identified, and entered.   The perit

## 2018-05-26 NOTE — DISCHARGE SUMMARY
[facility]    Discharge Summary    Kailee Goodman Patient Status:  Inpatient    11/3/1980 MRN [id number]   Location [address]  Attending Laz Funez MD   Psychiatric Day # 3       Delivering OB Clinician:

## 2018-05-26 NOTE — LACTATION NOTE
This note was copied from a baby's chart.   LACTATION NOTE - INFANT    Evaluation Type  Evaluation Type: Inpatient    Problems & Assessment  Problems Diagnosed or Identified: Sleepy  Infant Assessment: Hunger cues present;Skin color: pink or appropriate for

## 2018-05-26 NOTE — ANESTHESIA POST-OP FOLLOW-UP NOTE
[facility]   Acute Pain Rounds Note  2018    Patient name: Giovanni Dumont 42year old female  : 11/3/1980  MRN: [id number]    Diagnosis: No diagnosis found.     S/P:     Pain modality: Duramorph    Current hospital day: Hospit

## 2018-05-27 RX ORDER — FUROSEMIDE 20 MG/1
20 TABLET ORAL ONCE
Status: COMPLETED | OUTPATIENT
Start: 2018-05-27 | End: 2018-05-27

## 2018-05-27 RX ORDER — ONDANSETRON 2 MG/ML
4 INJECTION INTRAMUSCULAR; INTRAVENOUS ONCE AS NEEDED
Status: DISCONTINUED | OUTPATIENT
Start: 2018-05-27 | End: 2018-05-27 | Stop reason: ALTCHOICE

## 2018-05-27 RX ORDER — FUROSEMIDE 20 MG/1
20 TABLET ORAL DAILY
Status: DISCONTINUED | OUTPATIENT
Start: 2018-05-27 | End: 2018-05-27

## 2018-05-27 RX ORDER — KETOROLAC TROMETHAMINE 30 MG/ML
30 INJECTION, SOLUTION INTRAMUSCULAR; INTRAVENOUS ONCE AS NEEDED
Status: DISCONTINUED | OUTPATIENT
Start: 2018-05-27 | End: 2018-05-27 | Stop reason: ALTCHOICE

## 2018-05-27 RX ORDER — FAMOTIDINE 20 MG/1
20 TABLET ORAL 2 TIMES DAILY
Status: DISCONTINUED | OUTPATIENT
Start: 2018-05-27 | End: 2018-05-29

## 2018-05-27 RX ORDER — DOCUSATE SODIUM 100 MG/1
100 CAPSULE, LIQUID FILLED ORAL 2 TIMES DAILY
Status: DISCONTINUED | OUTPATIENT
Start: 2018-05-27 | End: 2018-05-29

## 2018-05-27 RX ORDER — NALBUPHINE HCL 10 MG/ML
2.5 AMPUL (ML) INJECTION
Status: DISCONTINUED | OUTPATIENT
Start: 2018-05-27 | End: 2018-05-27 | Stop reason: ALTCHOICE

## 2018-05-27 RX ORDER — SODIUM CHLORIDE, SODIUM LACTATE, POTASSIUM CHLORIDE, CALCIUM CHLORIDE 600; 310; 30; 20 MG/100ML; MG/100ML; MG/100ML; MG/100ML
INJECTION, SOLUTION INTRAVENOUS CONTINUOUS
Status: DISCONTINUED | OUTPATIENT
Start: 2018-05-27 | End: 2018-05-27 | Stop reason: ALTCHOICE

## 2018-05-27 RX ORDER — IBUPROFEN 600 MG/1
600 TABLET ORAL EVERY 6 HOURS
Status: DISCONTINUED | OUTPATIENT
Start: 2018-05-27 | End: 2018-05-29

## 2018-05-27 RX ORDER — ACETAMINOPHEN 325 MG/1
650 TABLET ORAL EVERY 6 HOURS PRN
Status: DISCONTINUED | OUTPATIENT
Start: 2018-05-27 | End: 2018-05-29

## 2018-05-27 RX ORDER — FUROSEMIDE 10 MG/ML
20 INJECTION INTRAMUSCULAR; INTRAVENOUS ONCE
Status: DISCONTINUED | OUTPATIENT
Start: 2018-05-27 | End: 2018-05-27

## 2018-05-27 RX ORDER — DIPHENHYDRAMINE HYDROCHLORIDE 50 MG/ML
25 INJECTION INTRAMUSCULAR; INTRAVENOUS ONCE AS NEEDED
Status: DISCONTINUED | OUTPATIENT
Start: 2018-05-27 | End: 2018-05-27 | Stop reason: ALTCHOICE

## 2018-05-27 NOTE — LACTATION NOTE
LACTATION NOTE - MOTHER           Problems identified  Problems identified: Knowledge deficit    Maternal history  Other/comment: congential adrenal hyperplasia    Breastfeeding goal  Breastfeeding goal: To maintain breast milk feeding per patient goal

## 2018-05-27 NOTE — PROGRESS NOTES
Post-Partum Note   5/27/2018, 8:04 AM    Subjective:  Patient doing well. Her pain is well controlled. She is now voiding without difficulty. She does complain of swelling in her thighs. She states she is not able to bend her legs and walk.  She denies HA,

## 2018-05-27 NOTE — LACTATION NOTE
This note was copied from a baby's chart.   LACTATION NOTE - INFANT    Evaluation Type  Evaluation Type: Inpatient    Problems & Assessment  Problems Diagnosed or Identified: Sleepy  Infant Assessment: Minimal hunger cues present;Skin color: jaundice  Muscl

## 2018-05-27 NOTE — PLAN OF CARE
Sat with patient to review plan of care. Discussed analgesic options. Answered all questions. VSS. Breastfeeding on demand. Breastfeeding successfully. Voiding independently. Lochia is WNL. Uterus is firm.

## 2018-05-28 NOTE — LACTATION NOTE
This note was copied from a baby's chart.   LACTATION NOTE - INFANT    Evaluation Type  Evaluation Type: Inpatient    Problems & Assessment  Problems Diagnosed or Identified: Jaundice  Infant Assessment: Minimal hunger cues present;Oral mucous membranes jerry

## 2018-05-28 NOTE — PROGRESS NOTES
[facility]    Post  Progress Note      Reynaldo Lozada Patient Status:  Inpatient    11/3/1980 MRN [id number]   Location [facility] 3SE Attending Merna Soto MD   Hosp Day # 6 PCP Unknown Pcp       Subjective

## 2018-05-28 NOTE — LACTATION NOTE
LACTATION NOTE - MOTHER      Evaluation Type: Inpatient    Problems identified  Problems identified: Knowledge deficit    Maternal history  Maternal history: AMA;Obesity; Infertility;PIH  Other/comment: congential adrenal hyperplasia    Breastfeeding goal

## 2018-05-29 VITALS
SYSTOLIC BLOOD PRESSURE: 137 MMHG | RESPIRATION RATE: 16 BRPM | DIASTOLIC BLOOD PRESSURE: 81 MMHG | OXYGEN SATURATION: 98 % | TEMPERATURE: 98 F | HEART RATE: 79 BPM

## 2018-05-29 RX ORDER — IBUPROFEN 600 MG/1
600 TABLET ORAL EVERY 6 HOURS
Qty: 30 TABLET | Refills: 0 | Status: ON HOLD | OUTPATIENT
Start: 2018-05-29 | End: 2018-06-05

## 2018-05-29 RX ORDER — HYDROCODONE BITARTRATE AND ACETAMINOPHEN 5; 325 MG/1; MG/1
1 TABLET ORAL EVERY 4 HOURS PRN
Qty: 30 TABLET | Refills: 0 | Status: ON HOLD | OUTPATIENT
Start: 2018-05-29 | End: 2018-06-05

## 2018-05-29 NOTE — PROGRESS NOTES
[place] [facility]    Post  Progress Note      Reynaldo Lozada Patient Status:  Inpatient    11/3/1980 MRN [id number]   Location [facility] 3SE Attending Merna Soto MD   Hosp Day # 7 PCP Unknown Pcp       Subjective

## 2018-05-31 NOTE — PAYOR COMM NOTE
--------------  DISCHARGE REVIEW    Secondary Payor: N/A  Subscriber #:    Secondary Payor Authorization Number: N/A      Admit date: 5/22/18  Admit time:  1815  Discharge Date: 5/29/2018 12:42 PM     Admitting Physician: Lupis Carlton DO  Attending Ph anterior lip and no further. Female infant (Diane) No complications. Routine delivery and postpartum care    Discharged Condition: stable    Disposition: home    Plan:     Follow-up appointment in 6 weeks with Dr. Carlos Roberts.   RTC 2 days for BP check      Kinl maternal age, subcortical hypothyroidism, obesity affecting pregnancy, and lastly the aforementioned gestational hypertension.   I had discussed marginal blood pressures with her as early as 22 weeks' gestation and she was doing a home blood pressure diary Pitocin. On pelvic exam, cervix was fingertip, 50% effaced, and head at -3 station. IMPRESSION:  Term pregnancy with gestational hypertension. PLAN:  Continue Pitocin induction.   I did discuss the possibility of the patient needing serial induction

## 2018-06-04 ENCOUNTER — TELEPHONE (OUTPATIENT)
Dept: OBGYN CLINIC | Facility: CLINIC | Age: 38
End: 2018-06-04

## 2018-06-04 ENCOUNTER — HOSPITAL ENCOUNTER (INPATIENT)
Facility: HOSPITAL | Age: 38
LOS: 1 days | Discharge: HOME | End: 2018-06-05
Attending: EMERGENCY MEDICINE | Admitting: OBSTETRICS & GYNECOLOGY
Payer: COMMERCIAL

## 2018-06-04 ENCOUNTER — APPOINTMENT (OUTPATIENT)
Dept: ULTRASOUND IMAGING | Facility: HOSPITAL | Age: 38
End: 2018-06-04
Attending: EMERGENCY MEDICINE
Payer: COMMERCIAL

## 2018-06-04 ENCOUNTER — NURSE ONLY (OUTPATIENT)
Dept: OBGYN CLINIC | Facility: CLINIC | Age: 38
End: 2018-06-04

## 2018-06-04 ENCOUNTER — TELEPHONE (OUTPATIENT)
Dept: PEDIATRICS CLINIC | Facility: CLINIC | Age: 38
End: 2018-06-04

## 2018-06-04 VITALS — DIASTOLIC BLOOD PRESSURE: 98 MMHG | SYSTOLIC BLOOD PRESSURE: 170 MMHG

## 2018-06-04 DIAGNOSIS — Z01.30 BP CHECK: Primary | ICD-10-CM

## 2018-06-04 DIAGNOSIS — I10 HYPERTENSION, UNSPECIFIED TYPE: Primary | ICD-10-CM

## 2018-06-04 PROCEDURE — 99221 1ST HOSP IP/OBS SF/LOW 40: CPT | Performed by: OBSTETRICS & GYNECOLOGY

## 2018-06-04 PROCEDURE — 93970 EXTREMITY STUDY: CPT | Performed by: EMERGENCY MEDICINE

## 2018-06-04 RX ORDER — BISACODYL 10 MG
10 SUPPOSITORY, RECTAL RECTAL ONCE AS NEEDED
Status: DISCONTINUED | OUTPATIENT
Start: 2018-06-04 | End: 2018-06-05

## 2018-06-04 RX ORDER — LABETALOL HYDROCHLORIDE 5 MG/ML
20 INJECTION, SOLUTION INTRAVENOUS ONCE
Status: COMPLETED | OUTPATIENT
Start: 2018-06-04 | End: 2018-06-04

## 2018-06-04 RX ORDER — ONDANSETRON 2 MG/ML
4 INJECTION INTRAMUSCULAR; INTRAVENOUS EVERY 6 HOURS PRN
Status: DISCONTINUED | OUTPATIENT
Start: 2018-06-04 | End: 2018-06-05

## 2018-06-04 RX ORDER — SODIUM CHLORIDE 0.9 % (FLUSH) 0.9 %
10 SYRINGE (ML) INJECTION AS NEEDED
Status: DISCONTINUED | OUTPATIENT
Start: 2018-06-04 | End: 2018-06-05

## 2018-06-04 RX ORDER — DOCUSATE SODIUM 100 MG/1
100 CAPSULE, LIQUID FILLED ORAL 2 TIMES DAILY
Status: DISCONTINUED | OUTPATIENT
Start: 2018-06-04 | End: 2018-06-05

## 2018-06-04 RX ORDER — LABETALOL 200 MG/1
200 TABLET, FILM COATED ORAL ONCE
Status: COMPLETED | OUTPATIENT
Start: 2018-06-04 | End: 2018-06-04

## 2018-06-04 RX ORDER — PRENATAL VIT,CAL 76/IRON/FOLIC 29 MG-1 MG
1 TABLET ORAL DAILY
Status: DISCONTINUED | OUTPATIENT
Start: 2018-06-04 | End: 2018-06-05

## 2018-06-04 RX ORDER — SIMETHICONE 80 MG
80 TABLET,CHEWABLE ORAL 3 TIMES DAILY PRN
Status: DISCONTINUED | OUTPATIENT
Start: 2018-06-04 | End: 2018-06-05

## 2018-06-04 RX ORDER — DIAPER,BRIEF,INFANT-TODD,DISP
1 EACH MISCELLANEOUS EVERY 6 HOURS PRN
Status: DISCONTINUED | OUTPATIENT
Start: 2018-06-04 | End: 2018-06-05

## 2018-06-04 NOTE — ED PROVIDER NOTES
Patient Seen in: [facility] AND [facility] Emergency Department    History   Patient presents with:  Hypertension (cardiovascular)    Stated Complaint: High Blood Pressure    HPI    43-year-old female  status post  May 25 with pregnancy complicated All other systems reviewed and negative except as noted above.     Physical Exam   ED Triage Vitals [06/04/18 1643]  BP: (!) 178/110  Pulse: 88  Resp: 20  Temp: 98.3 °F (36.8 °C)  Temp src: Oral  SpO2: 99 %  O2 Device: None (Room air)    Current:BP (!) All other components within normal limits   URINALYSIS WITH CULTURE REFLEX - Abnormal; Notable for the following:     Clarity Urine Hazy (*)     Blood Urine Large (*)     Leukocyte Esterase Urine Large (*)     WBC Urine 114 (*)     RBC URINE 49 (*)     Aleksandar 6/4/2018  PROCEDURE: US VENOUS DOPPLER LEG BILAT-DIAG IMG (CPT=93970)  COMPARISON: None.   INDICATIONS: Bilateral leg pain and swelling, postpartum  TECHNIQUE: Color duplex Doppler venous ultrasound of both lower extremities was performed in the  usual trotter

## 2018-06-04 NOTE — TELEPHONE ENCOUNTER
Received Mother's [address] Consultation Report dated 6/2/18. Placed on [facility]'s desk for review.

## 2018-06-04 NOTE — PROGRESS NOTES
Pt is 10 days PP and presents to clinic today for B/P check. Pt had gestational htn. B/P check a couple times with manual cuff and get elevated results.  Pt states she was not checking B/P since she delivered but did check today at home a couple times and r

## 2018-06-04 NOTE — ED NOTES
Rec'd patient sitting on cart with complaints of elevated BP since pregnancy. States she ended up in the hospital at one time due to her BP.   States today she went to see the RN at her OB's office and BP was elevated and was told to come to ED for evaluat

## 2018-06-04 NOTE — TELEPHONE ENCOUNTER
Pt delivered 5/25 and was told to come in for BP check in 2 days. She states she forgot. Asked her to come today since she was d/c'd 6 days ago already. Pt will call her  and see if he can drive her here at 3 or 345 today.  If pt calls back please he

## 2018-06-04 NOTE — ED INITIAL ASSESSMENT (HPI)
Pt seen by OB/GYNE today for f/u with \"high blood pressure problems since having a baby at 37 weeks induced labor\" on 5/26 - pt states she was never put on any medications for her BP- states she checked today and it was high so came in. Denies pain.

## 2018-06-05 VITALS
WEIGHT: 148 LBS | DIASTOLIC BLOOD PRESSURE: 93 MMHG | TEMPERATURE: 98 F | HEART RATE: 86 BPM | RESPIRATION RATE: 20 BRPM | BODY MASS INDEX: 33.29 KG/M2 | SYSTOLIC BLOOD PRESSURE: 146 MMHG | OXYGEN SATURATION: 89 % | HEIGHT: 56 IN

## 2018-06-05 PROBLEM — I10 HYPERTENSION, UNSPECIFIED TYPE: Status: RESOLVED | Noted: 2018-06-04 | Resolved: 2018-06-05

## 2018-06-05 PROCEDURE — 99238 HOSP IP/OBS DSCHRG MGMT 30/<: CPT | Performed by: OBSTETRICS & GYNECOLOGY

## 2018-06-05 RX ORDER — ACETAMINOPHEN 325 MG/1
650 TABLET ORAL EVERY 6 HOURS PRN
Status: DISCONTINUED | OUTPATIENT
Start: 2018-06-05 | End: 2018-06-05

## 2018-06-05 RX ORDER — ACETAMINOPHEN 500 MG
1000 TABLET ORAL EVERY 6 HOURS PRN
Status: DISCONTINUED | OUTPATIENT
Start: 2018-06-05 | End: 2018-06-05

## 2018-06-05 RX ORDER — LABETALOL 200 MG/1
200 TABLET, FILM COATED ORAL EVERY 12 HOURS SCHEDULED
Status: DISCONTINUED | OUTPATIENT
Start: 2018-06-05 | End: 2018-06-05

## 2018-06-05 RX ORDER — LABETALOL HYDROCHLORIDE 5 MG/ML
20 INJECTION, SOLUTION INTRAVENOUS
Status: DISCONTINUED | OUTPATIENT
Start: 2018-06-05 | End: 2018-06-05

## 2018-06-05 RX ORDER — HYDRALAZINE HYDROCHLORIDE 20 MG/ML
INJECTION INTRAMUSCULAR; INTRAVENOUS
Status: DISCONTINUED | OUTPATIENT
Start: 2018-06-05 | End: 2018-06-05

## 2018-06-05 RX ORDER — LEVOTHYROXINE SODIUM 0.05 MG/1
25 TABLET ORAL
Status: DISCONTINUED | OUTPATIENT
Start: 2018-06-05 | End: 2018-06-05

## 2018-06-05 RX ORDER — CALCIUM GLUCONATE 94 MG/ML
1 INJECTION, SOLUTION INTRAVENOUS ONCE
Status: DISCONTINUED | OUTPATIENT
Start: 2018-06-05 | End: 2018-06-05

## 2018-06-05 NOTE — H&P
[address] Patient Status:  Inpatient    11/3/1980 MRN [id number]   Location [address] [place] [address] Attending [facility], DO   Hosp Day # 0 PCP Vishnu Blanco     Date o thereafter (return to pre             pregn dose after delivery)            Check TSH q 4-6 wks til 20 wks, then at 24-28 wks            then at 34-36 wks                                    12-11-17  Meds initiated by OCTAVIA during w/u and            IVF. HYDROcodone-acetaminophen 5-325 MG Oral Tab Take 1 tablet by mouth every 4 (four) hours as needed. Disp: 30 tablet Rfl: 0    RaNITidine HCl 150 MG Oral Cap Take 150 mg by mouth daily as needed for Heartburn.  Disp:  Rfl:  5/22/2018 at Unknown time   Levot (A) Clear   Spec Gravity 1.008 1.002 - 1.035   pH Urine 7.0 5.0 - 8.0   Protein Urine Negative Negative mg/dL   Glucose Urine Negative Negative mg/dL   Ketones Urine Negative Negative mg/dL   Bilirubin Urine Negative Negative   Blood Urine Large (A) Negati spoke to Dr. Gin Levine. I recommended for 20mg IVP then 40mg IVP labetalol with persistent elevated BP. Then rec'ed PO labetalol 200mg. BP still elevated. Spoke to Dr. Pamela SUN on call regarding need for mag sulfate given severe range BPs.  At this time

## 2018-06-05 NOTE — LACTATION NOTE
Mom is 11 days PP, reports Bf going well at home,using NS. Mom had lactation consult at home and has been pumping and supplementing. Pumping recommendations reviewed.  Mom states is confident w/using Ameda pump,pumping every 2-3 hours as tolerated and denies

## 2018-06-05 NOTE — PROGRESS NOTES
6/5/2018, 4:01 PM    Subjective:     Headache resolved    Objective:   06/05/18  1345 06/05/18  1400 06/05/18  1430 06/05/18  1543   BP: 150/89 (!) 144/91 131/83 (!) 146/93   Pulse: 76 80 75 86   Resp:       Temp:       TempSrc:       SpO2:       Weight:

## 2018-06-05 NOTE — DISCHARGE SUMMARY
[facility]    Discharge Summary    Keny Jean-Baptiste Patient Status:  Inpatient    11/3/1980 MRN [id number]   Location [address] [facility] Attending Garth Corley, [address] [facility] Day # 1 PCP Tasneem Zacarias

## 2018-06-05 NOTE — PROGRESS NOTES
[facility]    Post Partum Progress Note    Mary Estrada Patient Status:  Inpatient    11/3/1980 MRN [id number]   Location [address] Attending Uzma Baeza, [address] [facility] Day # 1 PCP Damaso Dillard initiated by OCTAVIA during w/u and            IVF. OCTAVIA is managing meds             and labs. Patient will have result sent here.        AMA (advanced maternal age) multigravida 35+            Options of FTS, CVS, amnio, genetic counseling,            Level 1 °C)-98.3 °F (36.8 °C)] 97.8 °F (36.6 °C)  Pulse:  [70-88] 74  Resp:  [14-30] 20  BP: (119-181)/() 126/81    General: Alert and Oriented  Abdomen: Soft, non tender uterus below umbilicus  Ext: non tender  Inc: c/d/i      Lab Review:    Results for ord Result Value Ref Range   Urine Protein/Creatinine Ratio, Random, OB     Creatinine Ur Random 24.3 mg/dL   Total Protein Urine Random <6.0 mg/dL   -RAINBOW DRAW BLUE   Result Value Ref Range   Hold Blue Auto Resulted    -RAINBOW DRAW LAVENDER   Result Sparkle

## 2018-06-05 NOTE — PAYOR COMM NOTE
--------------  ADMISSION REVIEW     Payor: [address] [place] [address] PANCHITO/FER  Subscriber #:  [id number]  Authorization Number: [id number]    Admit date: 6/4/18  Admit time: 2311       Admitting Physician: Allan Pradhan DO  Attending Physician:  Allan Pradhan Smoking status: Never Smoker                                                              Smokeless tobacco: Never Used                      Alcohol use: No               Comment: Seldom. Review of Systems   Constitutional: Negative.     HENT: Negativ Abdominal: Soft. Normal appearance. There is no tenderness. There is no rigidity, no rebound, no guarding, no CVA tenderness, no tenderness at McBurney's point and negative Mcclain's sign. No hernia. Musculoskeletal: Normal range of motion.  She exhibits e RAINBOW DRAW GOLD   RAINBOW DRAW LAVENDER TALL (BNP)   URINE CULTURE, ROUTINE       ED Course as of Jun 04 2328  ------------------------------------------------------------      MDM    CBC, CMP are unremarkable.   PT PTT normal.  Urinalysis without any pro Admission disposition: 6/4/2018  8:49 PM                 Disposition and Plan     Clinical Impression:  Hypertension, unspecified type  (primary encounter diagnosis)    Disposition:  Admit  6/4/2018  8:49 pm    Follow-up:  No follow-up provider specified. 04-04-18  See exam notes. To [facility] for monitoring            and labs. Gestational hypertension            4/27/2018: discussed w/ MD group -- plan for            weekly BPP & BP log at home.              Needs weekly NSTs and delivery at 40 w # Outcome Date GA Lbr Paul/2nd Weight Sex Delivery Anes PTL Lv   2 Term 05/25/18 37w2d 06:45 / 05:33 6 lb 9.5 oz (2.99 kg) F CS-LTranv EPI N CHRIS      Complications: Late decelerations,Variable decelerations,Group B beta strep +,Failure to progress   1 TAB 1 Sodium 139 136 - 144 mmol/L   Potassium 3.7 3.3 - 5.1 mmol/L   Chloride 108 95 - 110 mmol/L   CO2 23 22 - 32 mmol/L   BUN 8 8 - 20 mg/dL   Creatinine 0.63 0.50 - 1.50 mg/dL   Calcium, Total 8.6 8.5 - 10.5 mg/dL   BUN/CREA Ratio 12.7 10.0 - 20.0   Anion Gap -RAINBOW DRAW LIGHT GREEN   Result Value Ref Range   Hold Lt Green Auto Resulted    -RAINBOW DRAW GOLD   Result Value Ref Range   Hold Gold Auto Resulted    -RAINBOW DRAW LAVENDER TALL (BNP)   Result Value Ref Range   Hold Lavender Auto Resulted    -CBC W/ 6/4/2018 2227 Given 200 mg Oral Sam Neal RN      Labetalol HCl (NORMODYNE) tab 200 mg     Date Action Dose Route User    6/5/2018 0830 Given 200 mg Oral Zeinab Low RN      Labetalol HCl (TRANDATE) injection 20 mg     Date Action Dose Rout 4/27/2018: discussed w/ MD group -- plan for            weekly BPP & BP log at home. Needs weekly NSTs and delivery at 37 wks            Admitted 4/4-4/5.  24h UP too low to analyze.  Dianna Bunn when discussed at [employer]  2 Term 05/25/18 37w2d 06:45 / 05:33 6 lb 9.5 oz (2.99 kg) F CS-LTranv EPI N CHRIS      Complications: Late decelerations,Variable decelerations,Group B beta strep +,Failure to progress   1 TAB 1998                          Past Medical History:        Past M GFR, Non- >60 >=60   GFR, -American >60 >=60   -HEPATIC FUNCTION PANEL (7)   Result Value Ref Range   AST 24 15 - 41 U/L   ALT 21 14 - 54 U/L   Alkaline Phosphatase 91 32 - 100 U/L   Bilirubin, Total 0.5 0.3 - 1.2 mg/dL   Total Prote RBC 4.72 3.70 - 5.40 M/UL   HGB 13.6 12.0 - 16.0 g/dL   HCT 39.7 35.0 - 48.0 %   MCV 84.0 80.0 - 100.0 fL   MCH 28.8 27.0 - 32.0 pg   MCHC 34.3 32.0 - 37.0 g/dl   RDW 13.7 11.0 - 15.0 %    (H) 140 - 400 K/UL   MPV 8.4 7.4 - 10.3 fL   Neutrophil % 66

## 2018-06-08 ENCOUNTER — NURSE ONLY (OUTPATIENT)
Dept: OBGYN CLINIC | Facility: CLINIC | Age: 38
End: 2018-06-08

## 2018-06-08 VITALS — DIASTOLIC BLOOD PRESSURE: 86 MMHG | SYSTOLIC BLOOD PRESSURE: 138 MMHG | HEART RATE: 85 BPM

## 2018-06-08 DIAGNOSIS — Z01.30 BP CHECK: Primary | ICD-10-CM

## 2018-06-08 NOTE — PROGRESS NOTES
Pt is 2 weeks PP and presents to clinic today for B/P check. Pt provided B/P log from home and confirms she is taking Labetolol 200 mg BID. Pt reports occasional mild HA 2/10 that goes away without pain medication.  Pt denies blurred vision and also not see

## 2018-06-11 ENCOUNTER — NURSE ONLY (OUTPATIENT)
Dept: OBGYN CLINIC | Facility: CLINIC | Age: 38
End: 2018-06-11

## 2018-06-11 VITALS — SYSTOLIC BLOOD PRESSURE: 131 MMHG | DIASTOLIC BLOOD PRESSURE: 88 MMHG | HEART RATE: 78 BPM

## 2018-06-11 DIAGNOSIS — Z01.30 BP CHECK: Primary | ICD-10-CM

## 2018-06-11 NOTE — PROGRESS NOTES
Pt is PP and presents to clinic for f/u b/p check. Pt confirms she is taking Labetolol 200 mg BID. Pt reports having a constant HA 3/10 for the past 2 days. Pt took Tylenol without relief.  Reports she is sleeping through the night ( helps with baby)

## 2018-06-13 NOTE — TELEPHONE ENCOUNTER
[facility] on [phone] dated 5/16/18. Sent to scanning.

## 2018-06-18 ENCOUNTER — NURSE ONLY (OUTPATIENT)
Dept: OBGYN CLINIC | Facility: CLINIC | Age: 38
End: 2018-06-18

## 2018-06-18 VITALS — DIASTOLIC BLOOD PRESSURE: 83 MMHG | HEART RATE: 63 BPM | SYSTOLIC BLOOD PRESSURE: 132 MMHG

## 2018-06-18 DIAGNOSIS — Z01.30 BP CHECK: Primary | ICD-10-CM

## 2018-06-18 RX ORDER — LABETALOL 200 MG/1
200 TABLET, FILM COATED ORAL 2 TIMES DAILY
COMMUNITY
End: 2018-07-06

## 2018-06-18 NOTE — PROGRESS NOTES
Pt is about 4 weeks postpartum and is  here today for BP check,pt is without any complaint, BP is 132/83,i did consult it with MD on call ( CINDY ) and pt will keep taking Labetalol 200 mg twice a day,check BP at home and call the office back with headache,e

## 2018-07-06 ENCOUNTER — POSTPARTUM (OUTPATIENT)
Dept: OBGYN CLINIC | Facility: CLINIC | Age: 38
End: 2018-07-06

## 2018-07-06 VITALS
HEART RATE: 64 BPM | DIASTOLIC BLOOD PRESSURE: 80 MMHG | SYSTOLIC BLOOD PRESSURE: 122 MMHG | WEIGHT: 143 LBS | BODY MASS INDEX: 32 KG/M2

## 2018-07-06 RX ORDER — LABETALOL 200 MG/1
200 TABLET, FILM COATED ORAL 2 TIMES DAILY
Qty: 60 TABLET | Refills: 0 | Status: SHIPPED | OUTPATIENT
Start: 2018-07-06 | End: 2018-07-09

## 2018-07-06 NOTE — PROGRESS NOTES
Here for post-partum visit. Pt had a  delivery on 18 with Dr. Davis Mccormick. Infant- girl (Candido Chino) doing well. Re-admitted for elevated BP PP. Pt is breast feeding. Pt desire condoms for contraception. LMP -none.   Currently patient is not sexua

## 2018-07-09 ENCOUNTER — TELEPHONE (OUTPATIENT)
Dept: OBGYN CLINIC | Facility: CLINIC | Age: 38
End: 2018-07-09

## 2018-07-09 RX ORDER — LABETALOL 200 MG/1
200 TABLET, FILM COATED ORAL 2 TIMES DAILY
Qty: 60 TABLET | Refills: 0 | Status: SHIPPED | OUTPATIENT
Start: 2018-07-09 | End: 2018-08-08

## 2018-07-09 NOTE — TELEPHONE ENCOUNTER
Pharmacy stating that pt requesting a refill for Labetalol 200mg twice a day. Pt had her postpartum on 7/6/18 with Muriel Tillman. Sent to MD on Call, [facility], if pt can have a refill.

## 2018-07-09 NOTE — TELEPHONE ENCOUNTER
That's fine for 1 month. Patient needs f/u with PCP regarding BP mgmt now that she is out of her PP period. Thanks!

## 2018-07-11 ENCOUNTER — TELEPHONE (OUTPATIENT)
Dept: OBGYN CLINIC | Facility: CLINIC | Age: 38
End: 2018-07-11

## 2018-07-11 NOTE — TELEPHONE ENCOUNTER
Informed pt that Rx for Labetalol was sent to [facility] on Monday (7/9/18) at 4:39PM. Pt states she did not know that Rx was already sent. Asked pt when her appt with her PCP is and pt states her appt is on 7/19/18.

## 2018-07-11 NOTE — TELEPHONE ENCOUNTER
Pt states Dr Conrado Bryan would refill prescription once more while she gets appt with pcp.  pls adv

## 2019-09-21 ENCOUNTER — TELEPHONE (OUTPATIENT)
Dept: SCHEDULING | Age: 39
End: 2019-09-21

## 2019-09-21 ENCOUNTER — APPOINTMENT (OUTPATIENT)
Dept: URGENT CARE | Age: 39
End: 2019-09-21

## 2021-04-09 DIAGNOSIS — Z23 NEED FOR VACCINATION: ICD-10-CM

## 2021-11-26 ENCOUNTER — NURSE TRIAGE (OUTPATIENT)
Dept: FAMILY MEDICINE CLINIC | Facility: CLINIC | Age: 41
End: 2021-11-26

## 2021-11-26 NOTE — TELEPHONE ENCOUNTER
Reason for Call/Symptoms: hypertension (on Labatelol 100 mg twice daily), intermittent mid chest discomfort. Onset: since September 2021  Courtesy Assessment: Patient scheduled her own appt via my chart, looking for a new doctor. She lives in [place].  [facility]

## 2021-11-26 NOTE — TELEPHONE ENCOUNTER
Pt made an appointment as a new patient with Dr. Nessa Mauricio for 12/3/21 at 1:30 pm for the following     Looking for a new general physician. Discuss high blood pressure and chest pains.

## 2021-12-01 ENCOUNTER — OFFICE VISIT (OUTPATIENT)
Dept: FAMILY MEDICINE CLINIC | Facility: CLINIC | Age: 41
End: 2021-12-01
Payer: COMMERCIAL

## 2021-12-01 VITALS
OXYGEN SATURATION: 98 % | TEMPERATURE: 98 F | HEIGHT: 56 IN | BODY MASS INDEX: 34.28 KG/M2 | WEIGHT: 152.38 LBS | HEART RATE: 93 BPM | RESPIRATION RATE: 18 BRPM | SYSTOLIC BLOOD PRESSURE: 138 MMHG | DIASTOLIC BLOOD PRESSURE: 88 MMHG

## 2021-12-01 DIAGNOSIS — I10 PRIMARY HYPERTENSION: Primary | ICD-10-CM

## 2021-12-01 DIAGNOSIS — Z12.31 ENCOUNTER FOR SCREENING MAMMOGRAM FOR MALIGNANT NEOPLASM OF BREAST: ICD-10-CM

## 2021-12-01 PROCEDURE — 3079F DIAST BP 80-89 MM HG: CPT | Performed by: FAMILY MEDICINE

## 2021-12-01 PROCEDURE — 99203 OFFICE O/P NEW LOW 30 MIN: CPT | Performed by: FAMILY MEDICINE

## 2021-12-01 PROCEDURE — 3008F BODY MASS INDEX DOCD: CPT | Performed by: FAMILY MEDICINE

## 2021-12-01 PROCEDURE — 3075F SYST BP GE 130 - 139MM HG: CPT | Performed by: FAMILY MEDICINE

## 2021-12-01 RX ORDER — HYDROCODONE BITARTRATE AND ACETAMINOPHEN 5; 325 MG/1; MG/1
TABLET ORAL AS DIRECTED
COMMUNITY
End: 2021-12-01 | Stop reason: ALTCHOICE

## 2021-12-01 RX ORDER — LABETALOL 100 MG/1
100 TABLET, FILM COATED ORAL 2 TIMES DAILY
COMMUNITY
Start: 2021-10-29 | End: 2022-01-04

## 2021-12-01 RX ORDER — VALACYCLOVIR HYDROCHLORIDE 1 G/1
1 TABLET, FILM COATED ORAL AS DIRECTED
COMMUNITY
End: 2021-12-01

## 2021-12-01 RX ORDER — LABETALOL 100 MG/1
TABLET, FILM COATED ORAL
COMMUNITY
End: 2021-12-01

## 2021-12-01 RX ORDER — AMLODIPINE BESYLATE 5 MG/1
5 TABLET ORAL DAILY
Qty: 90 TABLET | Refills: 3 | Status: SHIPPED | OUTPATIENT
Start: 2021-12-01

## 2021-12-01 RX ORDER — LISINOPRIL 10 MG/1
TABLET ORAL
COMMUNITY
End: 2021-12-01 | Stop reason: ALTCHOICE

## 2021-12-01 RX ORDER — FLUTICASONE PROPIONATE 50 MCG
SPRAY, SUSPENSION (ML) NASAL
COMMUNITY
End: 2021-12-01

## 2021-12-01 RX ORDER — LISINOPRIL 10 MG/1
10 TABLET ORAL DAILY
COMMUNITY
Start: 2021-09-22 | End: 2021-12-01 | Stop reason: ALTCHOICE

## 2021-12-01 RX ORDER — LABETALOL 100 MG/1
100 TABLET, FILM COATED ORAL 2 TIMES DAILY
Qty: 90 TABLET | Refills: 0 | Status: CANCELLED | OUTPATIENT
Start: 2021-12-01

## 2021-12-01 RX ORDER — VALACYCLOVIR HYDROCHLORIDE 1 G/1
1000 TABLET, FILM COATED ORAL AS DIRECTED
Qty: 21 TABLET | Refills: 0 | Status: SHIPPED | OUTPATIENT
Start: 2021-12-01

## 2021-12-01 RX ORDER — CLOTRIMAZOLE AND BETAMETHASONE DIPROPIONATE 10; .64 MG/G; MG/G
CREAM TOPICAL
COMMUNITY
End: 2021-12-01 | Stop reason: ALTCHOICE

## 2021-12-01 RX ORDER — FLUTICASONE PROPIONATE 50 MCG
2 SPRAY, SUSPENSION (ML) NASAL 2 TIMES DAILY
COMMUNITY
Start: 2021-09-28

## 2021-12-01 RX ORDER — DOCOSAHEXAENOIC ACID 200 MG
CAPSULE ORAL
COMMUNITY
End: 2021-12-01 | Stop reason: ALTCHOICE

## 2021-12-01 RX ORDER — LEVOTHYROXINE SODIUM 0.03 MG/1
TABLET ORAL
COMMUNITY
End: 2021-12-01 | Stop reason: ALTCHOICE

## 2021-12-01 NOTE — PROGRESS NOTES
Myah Davis is a 35year old female. Patient presents with:  Establish Care: here to discuss increased blood pressure x 2month and chest pain on and off x 2 months. denies any SOB, dizziness, palpitations.        HPI:   Patient is a 10-year-old f Yes      Alcohol/week: 0.0 standard drinks      Comment: Seldom.      Drug use: No       REVIEW OF SYSTEMS:   GENERAL HEALTH: No fevers, chills, sweats, fatigue  VISION: No recent vision problems, blurry vision or double vision  HEENT: No decreased hearing preventive medicine medicine management, doing a brief physical exam and doing documentation. The patient indicates understanding of these issues and agrees to the plan. No follow-ups on file.

## 2022-01-04 ENCOUNTER — LAB ENCOUNTER (OUTPATIENT)
Dept: LAB | Age: 42
End: 2022-01-04
Attending: FAMILY MEDICINE
Payer: COMMERCIAL

## 2022-01-04 ENCOUNTER — OFFICE VISIT (OUTPATIENT)
Dept: FAMILY MEDICINE CLINIC | Facility: CLINIC | Age: 42
End: 2022-01-04
Payer: COMMERCIAL

## 2022-01-04 VITALS
SYSTOLIC BLOOD PRESSURE: 124 MMHG | DIASTOLIC BLOOD PRESSURE: 88 MMHG | HEART RATE: 95 BPM | HEIGHT: 56 IN | BODY MASS INDEX: 33.74 KG/M2 | WEIGHT: 150 LBS

## 2022-01-04 DIAGNOSIS — I10 PRIMARY HYPERTENSION: ICD-10-CM

## 2022-01-04 DIAGNOSIS — Z12.4 ENCOUNTER FOR PAPANICOLAOU SMEAR FOR CERVICAL CANCER SCREENING: ICD-10-CM

## 2022-01-04 DIAGNOSIS — E03.8 SUBCLINICAL HYPOTHYROIDISM: ICD-10-CM

## 2022-01-04 DIAGNOSIS — Z00.00 ROUTINE HEALTH MAINTENANCE: ICD-10-CM

## 2022-01-04 DIAGNOSIS — Z00.00 ROUTINE HEALTH MAINTENANCE: Primary | ICD-10-CM

## 2022-01-04 PROBLEM — Z34.90 NORMAL INTRAUTERINE PREGNANCY ON PRENATAL ULTRASOUND, ANTEPARTUM: Status: RESOLVED | Noted: 2018-03-10 | Resolved: 2022-01-04

## 2022-01-04 PROBLEM — O16.3 HIGH BLOOD PRESSURE AFFECTING PREGNANCY IN THIRD TRIMESTER, ANTEPARTUM (HCC): Status: RESOLVED | Noted: 2018-05-07 | Resolved: 2022-01-04

## 2022-01-04 PROBLEM — O16.3 HIGH BLOOD PRESSURE AFFECTING PREGNANCY IN THIRD TRIMESTER, ANTEPARTUM: Status: RESOLVED | Noted: 2018-05-07 | Resolved: 2022-01-04

## 2022-01-04 PROBLEM — O99.210 OBESITY AFFECTING PREGNANCY, ANTEPARTUM: Status: RESOLVED | Noted: 2018-03-10 | Resolved: 2022-01-04

## 2022-01-04 PROBLEM — O16.9 HYPERTENSION AFFECTING PREGNANCY, ANTEPARTUM (HCC): Status: RESOLVED | Noted: 2018-04-04 | Resolved: 2022-01-04

## 2022-01-04 PROBLEM — O09.529 AMA (ADVANCED MATERNAL AGE) MULTIGRAVIDA 35+ (HCC): Status: RESOLVED | Noted: 2017-11-25 | Resolved: 2022-01-04

## 2022-01-04 PROBLEM — O13.9 PIH (PREGNANCY INDUCED HYPERTENSION): Status: RESOLVED | Noted: 2018-05-17 | Resolved: 2022-01-04

## 2022-01-04 PROBLEM — O13.9 GESTATIONAL HYPERTENSION: Status: RESOLVED | Noted: 2018-04-04 | Resolved: 2022-01-04

## 2022-01-04 PROBLEM — O99.210 OBESITY AFFECTING PREGNANCY, ANTEPARTUM (HCC): Status: RESOLVED | Noted: 2018-03-10 | Resolved: 2022-01-04

## 2022-01-04 PROBLEM — Z34.90 NORMAL INTRAUTERINE PREGNANCY ON PRENATAL ULTRASOUND, ANTEPARTUM (HCC): Status: RESOLVED | Noted: 2018-03-10 | Resolved: 2022-01-04

## 2022-01-04 PROBLEM — O09.529 AMA (ADVANCED MATERNAL AGE) MULTIGRAVIDA 35+: Status: RESOLVED | Noted: 2017-11-25 | Resolved: 2022-01-04

## 2022-01-04 PROBLEM — B95.1 POSITIVE GBS TEST: Status: RESOLVED | Noted: 2018-05-16 | Resolved: 2022-01-04

## 2022-01-04 PROBLEM — O16.9 HYPERTENSION AFFECTING PREGNANCY, ANTEPARTUM: Status: RESOLVED | Noted: 2018-04-04 | Resolved: 2022-01-04

## 2022-01-04 PROBLEM — Z31.83 IN VITRO FERTILIZATION: Status: RESOLVED | Noted: 2017-11-25 | Resolved: 2022-01-04

## 2022-01-04 PROBLEM — O13.9 PIH (PREGNANCY INDUCED HYPERTENSION) (HCC): Status: RESOLVED | Noted: 2018-05-17 | Resolved: 2022-01-04

## 2022-01-04 PROBLEM — O13.9 GESTATIONAL HYPERTENSION (HCC): Status: RESOLVED | Noted: 2018-04-04 | Resolved: 2022-01-04

## 2022-01-04 LAB
ALBUMIN SERPL-MCNC: 4.2 G/DL (ref 3.4–5)
ALBUMIN/GLOB SERPL: 1.2 {RATIO} (ref 1–2)
ALP LIVER SERPL-CCNC: 61 U/L
ALT SERPL-CCNC: 31 U/L
ANION GAP SERPL CALC-SCNC: 6 MMOL/L (ref 0–18)
AST SERPL-CCNC: 12 U/L (ref 15–37)
BILIRUB SERPL-MCNC: 0.4 MG/DL (ref 0.1–2)
BUN BLD-MCNC: 10 MG/DL (ref 7–18)
BUN/CREAT SERPL: 13.7 (ref 10–20)
CALCIUM BLD-MCNC: 9.3 MG/DL (ref 8.5–10.1)
CHLORIDE SERPL-SCNC: 105 MMOL/L (ref 98–112)
CHOLEST SERPL-MCNC: 188 MG/DL (ref ?–200)
CO2 SERPL-SCNC: 28 MMOL/L (ref 21–32)
CREAT BLD-MCNC: 0.73 MG/DL
FASTING PATIENT LIPID ANSWER: NO
FASTING STATUS PATIENT QL REPORTED: NO
GLOBULIN PLAS-MCNC: 3.5 G/DL (ref 2.8–4.4)
GLUCOSE BLD-MCNC: 95 MG/DL (ref 70–99)
HDLC SERPL-MCNC: 52 MG/DL (ref 40–59)
LDLC SERPL CALC-MCNC: 117 MG/DL (ref ?–100)
NONHDLC SERPL-MCNC: 136 MG/DL (ref ?–130)
OSMOLALITY SERPL CALC.SUM OF ELEC: 287 MOSM/KG (ref 275–295)
POTASSIUM SERPL-SCNC: 3.8 MMOL/L (ref 3.5–5.1)
PROT SERPL-MCNC: 7.7 G/DL (ref 6.4–8.2)
SODIUM SERPL-SCNC: 139 MMOL/L (ref 136–145)
TRIGL SERPL-MCNC: 108 MG/DL (ref 30–149)
TSI SER-ACNC: 1.26 MIU/ML (ref 0.36–3.74)
VLDLC SERPL CALC-MCNC: 19 MG/DL (ref 0–30)

## 2022-01-04 PROCEDURE — 80061 LIPID PANEL: CPT

## 2022-01-04 PROCEDURE — 99396 PREV VISIT EST AGE 40-64: CPT | Performed by: FAMILY MEDICINE

## 2022-01-04 PROCEDURE — 84443 ASSAY THYROID STIM HORMONE: CPT

## 2022-01-04 PROCEDURE — 3079F DIAST BP 80-89 MM HG: CPT | Performed by: FAMILY MEDICINE

## 2022-01-04 PROCEDURE — 3074F SYST BP LT 130 MM HG: CPT | Performed by: FAMILY MEDICINE

## 2022-01-04 PROCEDURE — 3008F BODY MASS INDEX DOCD: CPT | Performed by: FAMILY MEDICINE

## 2022-01-04 PROCEDURE — 36415 COLL VENOUS BLD VENIPUNCTURE: CPT

## 2022-01-04 PROCEDURE — 80053 COMPREHEN METABOLIC PANEL: CPT

## 2022-01-04 NOTE — PROGRESS NOTES
Hetal Christensen is a 35year old female. Patient presents with:  Routine Physical: PAP today      HPI:   Health maintenance and Pap smear. Patient has primary hypertension. Patient recently changed from labetalol to amlodipine 5 mg.   Patient's blo pain and denies heartburn, nausea or vomiting  : No Pain on urination, change in the color of urine, discharge, urinating frequently  MUS: No back pain, joint pain, muscle pain  NEURO: denies headaches , anxiety, depression    EXAM:   /88   Pulse 9

## 2022-01-05 LAB — HPV I/H RISK 1 DNA SPEC QL NAA+PROBE: NEGATIVE

## 2022-01-31 ENCOUNTER — HOSPITAL ENCOUNTER (OUTPATIENT)
Dept: MAMMOGRAPHY | Age: 42
Discharge: HOME | End: 2022-01-31
Attending: FAMILY MEDICINE
Payer: COMMERCIAL

## 2022-01-31 DIAGNOSIS — Z12.31 ENCOUNTER FOR SCREENING MAMMOGRAM FOR MALIGNANT NEOPLASM OF BREAST: ICD-10-CM

## 2022-01-31 PROCEDURE — 77063 BREAST TOMOSYNTHESIS BI: CPT | Performed by: FAMILY MEDICINE

## 2022-01-31 PROCEDURE — 77067 SCR MAMMO BI INCL CAD: CPT | Performed by: FAMILY MEDICINE

## 2022-05-24 ENCOUNTER — HOSPITAL ENCOUNTER (OUTPATIENT)
Dept: CT IMAGING | Facility: HOSPITAL | Age: 42
Discharge: HOME | End: 2022-05-24
Attending: FAMILY MEDICINE

## 2022-05-24 DIAGNOSIS — Z13.6 SCREENING FOR CARDIOVASCULAR CONDITION: ICD-10-CM

## 2022-08-04 ENCOUNTER — HOSPITAL ENCOUNTER (EMERGENCY)
Facility: HOSPITAL | Age: 42
Discharge: HOME | End: 2022-08-04
Attending: EMERGENCY MEDICINE
Payer: COMMERCIAL

## 2022-08-04 VITALS
WEIGHT: 150 LBS | HEART RATE: 83 BPM | TEMPERATURE: 98 F | OXYGEN SATURATION: 100 % | BODY MASS INDEX: 34 KG/M2 | DIASTOLIC BLOOD PRESSURE: 95 MMHG | RESPIRATION RATE: 18 BRPM | SYSTOLIC BLOOD PRESSURE: 137 MMHG

## 2022-08-04 DIAGNOSIS — U07.1 COVID-19 VIRUS INFECTION: Primary | ICD-10-CM

## 2022-08-04 DIAGNOSIS — E86.0 MILD DEHYDRATION: ICD-10-CM

## 2022-08-04 LAB
ANION GAP SERPL CALC-SCNC: 9 MMOL/L (ref 0–18)
BASOPHILS # BLD AUTO: 0.03 X10(3) UL (ref 0–0.2)
BASOPHILS NFR BLD AUTO: 0.6 %
BUN BLD-MCNC: 11 MG/DL (ref 7–18)
BUN/CREAT SERPL: 15.3 (ref 10–20)
CALCIUM BLD-MCNC: 9.2 MG/DL (ref 8.5–10.1)
CHLORIDE SERPL-SCNC: 106 MMOL/L (ref 98–112)
CO2 SERPL-SCNC: 25 MMOL/L (ref 21–32)
CREAT BLD-MCNC: 0.72 MG/DL
DEPRECATED RDW RBC AUTO: 40.6 FL (ref 35.1–46.3)
EOSINOPHIL # BLD AUTO: 0.11 X10(3) UL (ref 0–0.7)
EOSINOPHIL NFR BLD AUTO: 2.1 %
ERYTHROCYTE [DISTWIDTH] IN BLOOD BY AUTOMATED COUNT: 13 % (ref 11–15)
GFR SERPLBLD BASED ON 1.73 SQ M-ARVRAT: 106 ML/MIN/1.73M2 (ref 60–?)
GLUCOSE BLD-MCNC: 109 MG/DL (ref 70–99)
HCT VFR BLD AUTO: 44.4 %
HGB BLD-MCNC: 14.5 G/DL
IMM GRANULOCYTES # BLD AUTO: 0.01 X10(3) UL (ref 0–1)
IMM GRANULOCYTES NFR BLD: 0.2 %
LYMPHOCYTES # BLD AUTO: 1.71 X10(3) UL (ref 1–4)
LYMPHOCYTES NFR BLD AUTO: 32.3 %
MCH RBC QN AUTO: 27.9 PG (ref 26–34)
MCHC RBC AUTO-ENTMCNC: 32.7 G/DL (ref 31–37)
MCV RBC AUTO: 85.5 FL
MONOCYTES # BLD AUTO: 0.69 X10(3) UL (ref 0.1–1)
MONOCYTES NFR BLD AUTO: 13 %
NEUTROPHILS # BLD AUTO: 2.75 X10 (3) UL (ref 1.5–7.7)
NEUTROPHILS # BLD AUTO: 2.75 X10(3) UL (ref 1.5–7.7)
NEUTROPHILS NFR BLD AUTO: 51.8 %
OSMOLALITY SERPL CALC.SUM OF ELEC: 290 MOSM/KG (ref 275–295)
PLATELET # BLD AUTO: 238 10(3)UL (ref 150–450)
POTASSIUM SERPL-SCNC: 3.6 MMOL/L (ref 3.5–5.1)
RBC # BLD AUTO: 5.19 X10(6)UL
SODIUM SERPL-SCNC: 140 MMOL/L (ref 136–145)
WBC # BLD AUTO: 5.3 X10(3) UL (ref 4–11)

## 2022-08-04 PROCEDURE — 93010 ELECTROCARDIOGRAM REPORT: CPT | Performed by: EMERGENCY MEDICINE

## 2022-08-04 PROCEDURE — 99284 EMERGENCY DEPT VISIT MOD MDM: CPT

## 2022-08-04 PROCEDURE — 85025 COMPLETE CBC W/AUTO DIFF WBC: CPT | Performed by: EMERGENCY MEDICINE

## 2022-08-04 PROCEDURE — 96360 HYDRATION IV INFUSION INIT: CPT

## 2022-08-04 PROCEDURE — 93005 ELECTROCARDIOGRAM TRACING: CPT

## 2022-08-04 PROCEDURE — 80048 BASIC METABOLIC PNL TOTAL CA: CPT | Performed by: EMERGENCY MEDICINE

## 2022-08-04 RX ORDER — ACETAMINOPHEN 500 MG
1000 TABLET ORAL ONCE
Status: COMPLETED | OUTPATIENT
Start: 2022-08-04 | End: 2022-08-04

## 2022-08-30 ENCOUNTER — OFFICE VISIT (OUTPATIENT)
Dept: FAMILY MEDICINE CLINIC | Facility: CLINIC | Age: 42
End: 2022-08-30
Payer: COMMERCIAL

## 2022-08-30 VITALS
HEART RATE: 99 BPM | DIASTOLIC BLOOD PRESSURE: 95 MMHG | OXYGEN SATURATION: 98 % | RESPIRATION RATE: 18 BRPM | SYSTOLIC BLOOD PRESSURE: 147 MMHG | WEIGHT: 153 LBS | BODY MASS INDEX: 34 KG/M2 | TEMPERATURE: 98 F

## 2022-08-30 DIAGNOSIS — Z78.9 EDUCATED ABOUT MANAGEMENT OF WEIGHT: ICD-10-CM

## 2022-08-30 DIAGNOSIS — R94.31 ABNORMAL EKG: Primary | ICD-10-CM

## 2022-08-30 PROCEDURE — 93000 ELECTROCARDIOGRAM COMPLETE: CPT | Performed by: FAMILY MEDICINE

## 2022-08-30 PROCEDURE — 3080F DIAST BP >= 90 MM HG: CPT | Performed by: FAMILY MEDICINE

## 2022-08-30 PROCEDURE — 99214 OFFICE O/P EST MOD 30 MIN: CPT | Performed by: FAMILY MEDICINE

## 2022-08-30 PROCEDURE — 3077F SYST BP >= 140 MM HG: CPT | Performed by: FAMILY MEDICINE

## 2022-11-29 RX ORDER — AMLODIPINE BESYLATE 5 MG/1
TABLET ORAL
Qty: 90 TABLET | Refills: 3 | Status: SHIPPED | OUTPATIENT
Start: 2022-11-29

## 2023-02-09 ENCOUNTER — HOSPITAL ENCOUNTER (OUTPATIENT)
Dept: MAMMOGRAPHY | Age: 43
Discharge: HOME | End: 2023-02-09
Attending: FAMILY MEDICINE
Payer: COMMERCIAL

## 2023-02-09 DIAGNOSIS — Z12.31 ENCOUNTER FOR SCREENING MAMMOGRAM FOR MALIGNANT NEOPLASM OF BREAST: ICD-10-CM

## 2023-02-09 PROCEDURE — 77063 BREAST TOMOSYNTHESIS BI: CPT | Performed by: FAMILY MEDICINE

## 2023-02-09 PROCEDURE — 77067 SCR MAMMO BI INCL CAD: CPT | Performed by: FAMILY MEDICINE

## 2023-02-16 ENCOUNTER — LAB ENCOUNTER (OUTPATIENT)
Dept: LAB | Age: 43
End: 2023-02-16
Attending: FAMILY MEDICINE
Payer: COMMERCIAL

## 2023-02-16 ENCOUNTER — OFFICE VISIT (OUTPATIENT)
Dept: FAMILY MEDICINE CLINIC | Facility: CLINIC | Age: 43
End: 2023-02-16

## 2023-02-16 VITALS
BODY MASS INDEX: 34 KG/M2 | HEART RATE: 85 BPM | RESPIRATION RATE: 18 BRPM | WEIGHT: 153 LBS | TEMPERATURE: 98 F | SYSTOLIC BLOOD PRESSURE: 137 MMHG | DIASTOLIC BLOOD PRESSURE: 86 MMHG

## 2023-02-16 DIAGNOSIS — Z00.00 ROUTINE HEALTH MAINTENANCE: ICD-10-CM

## 2023-02-16 DIAGNOSIS — I10 PRIMARY HYPERTENSION: Primary | ICD-10-CM

## 2023-02-16 LAB
ALBUMIN SERPL-MCNC: 3.8 G/DL (ref 3.4–5)
ALBUMIN/GLOB SERPL: 1.1 {RATIO} (ref 1–2)
ALP LIVER SERPL-CCNC: 68 U/L
ALT SERPL-CCNC: 28 U/L
ANION GAP SERPL CALC-SCNC: 6 MMOL/L (ref 0–18)
AST SERPL-CCNC: 11 U/L (ref 15–37)
BILIRUB SERPL-MCNC: 0.3 MG/DL (ref 0.1–2)
BUN BLD-MCNC: 11 MG/DL (ref 7–18)
BUN/CREAT SERPL: 15.1 (ref 10–20)
CALCIUM BLD-MCNC: 9.2 MG/DL (ref 8.5–10.1)
CHLORIDE SERPL-SCNC: 109 MMOL/L (ref 98–112)
CHOLEST SERPL-MCNC: 155 MG/DL (ref ?–200)
CO2 SERPL-SCNC: 26 MMOL/L (ref 21–32)
CREAT BLD-MCNC: 0.73 MG/DL
DEPRECATED RDW RBC AUTO: 39.1 FL (ref 35.1–46.3)
ERYTHROCYTE [DISTWIDTH] IN BLOOD BY AUTOMATED COUNT: 13.1 % (ref 11–15)
FASTING PATIENT LIPID ANSWER: NO
FASTING STATUS PATIENT QL REPORTED: NO
GFR SERPLBLD BASED ON 1.73 SQ M-ARVRAT: 104 ML/MIN/1.73M2 (ref 60–?)
GLOBULIN PLAS-MCNC: 3.5 G/DL (ref 2.8–4.4)
GLUCOSE BLD-MCNC: 109 MG/DL (ref 70–99)
HCT VFR BLD AUTO: 42 %
HDLC SERPL-MCNC: 41 MG/DL (ref 40–59)
HGB BLD-MCNC: 14.1 G/DL
LDLC SERPL CALC-MCNC: 79 MG/DL (ref ?–100)
MCH RBC QN AUTO: 27.9 PG (ref 26–34)
MCHC RBC AUTO-ENTMCNC: 33.6 G/DL (ref 31–37)
MCV RBC AUTO: 83.2 FL
NONHDLC SERPL-MCNC: 114 MG/DL (ref ?–130)
OSMOLALITY SERPL CALC.SUM OF ELEC: 292 MOSM/KG (ref 275–295)
PLATELET # BLD AUTO: 328 10(3)UL (ref 150–450)
POTASSIUM SERPL-SCNC: 3.9 MMOL/L (ref 3.5–5.1)
PROT SERPL-MCNC: 7.3 G/DL (ref 6.4–8.2)
RBC # BLD AUTO: 5.05 X10(6)UL
SODIUM SERPL-SCNC: 141 MMOL/L (ref 136–145)
TRIGL SERPL-MCNC: 210 MG/DL (ref 30–149)
VLDLC SERPL CALC-MCNC: 33 MG/DL (ref 0–30)
WBC # BLD AUTO: 8.5 X10(3) UL (ref 4–11)

## 2023-02-16 PROCEDURE — 85027 COMPLETE CBC AUTOMATED: CPT

## 2023-02-16 PROCEDURE — 36415 COLL VENOUS BLD VENIPUNCTURE: CPT

## 2023-02-16 PROCEDURE — 80061 LIPID PANEL: CPT

## 2023-02-16 PROCEDURE — 80053 COMPREHEN METABOLIC PANEL: CPT

## 2023-02-24 ENCOUNTER — OFFICE VISIT (OUTPATIENT)
Dept: FAMILY MEDICINE CLINIC | Facility: CLINIC | Age: 43
End: 2023-02-24

## 2023-02-24 VITALS
BODY MASS INDEX: 34.42 KG/M2 | WEIGHT: 153 LBS | DIASTOLIC BLOOD PRESSURE: 72 MMHG | SYSTOLIC BLOOD PRESSURE: 106 MMHG | TEMPERATURE: 99 F | RESPIRATION RATE: 16 BRPM | HEIGHT: 56 IN | HEART RATE: 88 BPM | OXYGEN SATURATION: 99 %

## 2023-02-24 DIAGNOSIS — J02.9 SORE THROAT: Primary | ICD-10-CM

## 2023-02-24 LAB
CONTROL LINE PRESENT WITH A CLEAR BACKGROUND (YES/NO): YES YES/NO
KIT LOT #: NORMAL NUMERIC
STREP GRP A CUL-SCR: NEGATIVE

## 2023-02-24 PROCEDURE — 3074F SYST BP LT 130 MM HG: CPT

## 2023-02-24 PROCEDURE — 3008F BODY MASS INDEX DOCD: CPT

## 2023-02-24 PROCEDURE — 99213 OFFICE O/P EST LOW 20 MIN: CPT

## 2023-02-24 PROCEDURE — 3078F DIAST BP <80 MM HG: CPT

## 2023-02-24 PROCEDURE — 87880 STREP A ASSAY W/OPTIC: CPT

## 2023-02-24 RX ORDER — TRAMADOL HYDROCHLORIDE 50 MG/1
TABLET ORAL
COMMUNITY
Start: 2022-11-02 | End: 2023-02-24 | Stop reason: ALTCHOICE

## 2023-02-24 RX ORDER — METHYLPREDNISOLONE 4 MG/1
TABLET ORAL
COMMUNITY
Start: 2022-11-02 | End: 2023-02-24 | Stop reason: ALTCHOICE

## 2023-02-24 RX ORDER — LISINOPRIL 10 MG/1
TABLET ORAL
COMMUNITY
End: 2023-02-24 | Stop reason: ALTCHOICE

## 2023-02-24 RX ORDER — CYCLOBENZAPRINE HCL 10 MG
10 TABLET ORAL
COMMUNITY
Start: 2022-11-02 | End: 2023-02-24 | Stop reason: ALTCHOICE

## 2023-05-12 ENCOUNTER — HOSPITAL ENCOUNTER (OUTPATIENT)
Age: 43
Discharge: HOME | End: 2023-05-12
Payer: COMMERCIAL

## 2023-05-12 VITALS
TEMPERATURE: 98 F | OXYGEN SATURATION: 99 % | DIASTOLIC BLOOD PRESSURE: 80 MMHG | RESPIRATION RATE: 18 BRPM | SYSTOLIC BLOOD PRESSURE: 130 MMHG | HEART RATE: 83 BPM

## 2023-05-12 DIAGNOSIS — N30.00 ACUTE CYSTITIS WITHOUT HEMATURIA: Primary | ICD-10-CM

## 2023-05-12 LAB
B-HCG UR QL: NEGATIVE
BILIRUB UR QL STRIP: NEGATIVE
COLOR UR: YELLOW
GLUCOSE UR STRIP-MCNC: NEGATIVE MG/DL
KETONES UR STRIP-MCNC: NEGATIVE MG/DL
NITRITE UR QL STRIP: NEGATIVE
PH UR STRIP: 7 [PH]
PROT UR STRIP-MCNC: 30 MG/DL
SP GR UR STRIP: 1.01
UROBILINOGEN UR STRIP-ACNC: <2 MG/DL

## 2023-05-12 PROCEDURE — 81025 URINE PREGNANCY TEST: CPT | Performed by: EMERGENCY MEDICINE

## 2023-05-12 PROCEDURE — 87077 CULTURE AEROBIC IDENTIFY: CPT | Performed by: EMERGENCY MEDICINE

## 2023-05-12 PROCEDURE — 87086 URINE CULTURE/COLONY COUNT: CPT | Performed by: EMERGENCY MEDICINE

## 2023-05-12 PROCEDURE — 99213 OFFICE O/P EST LOW 20 MIN: CPT | Performed by: EMERGENCY MEDICINE

## 2023-05-12 PROCEDURE — 87186 SC STD MICRODIL/AGAR DIL: CPT | Performed by: EMERGENCY MEDICINE

## 2023-05-12 PROCEDURE — 81002 URINALYSIS NONAUTO W/O SCOPE: CPT | Performed by: EMERGENCY MEDICINE

## 2023-05-12 RX ORDER — CEPHALEXIN 500 MG/1
500 CAPSULE ORAL 2 TIMES DAILY
Qty: 14 CAPSULE | Refills: 0 | Status: SHIPPED | OUTPATIENT
Start: 2023-05-12 | End: 2023-05-19

## 2023-05-12 NOTE — ED INITIAL ASSESSMENT (HPI)
Pt came in due to urine frequency, urgency, and blood in urine for the past couple of days. Pt has easy non labored respirations.

## 2023-05-12 NOTE — DISCHARGE INSTRUCTIONS
These self-care steps can help prevent future infections:  Drink plenty of fluids. This helps to prevent dehydration and flush out your bladder. Do this unless you must restrict fluids for other health reasons, or your healthcare provider told you not to. Clean yourself correctly after going to the bathroom. Wipe from front to back after using the toilet. This helps prevent the spread of bacteria. Urinate more often. Don't try to hold urine in for a long time. Wear loose-fitting clothes and cotton underwear. Don't wear tight-fitting pants. Improve your diet and prevent constipation. Eat more fresh fruits and vegetables, and fiber. Eat less junk foods and fatty foods. Don't have sex until your symptoms are gone. Don't have caffeine, alcohol, and spicy foods. These can irritate your bladder. Urinate right after you have sex to flush out your bladder. If you use birth control pills and have frequent bladder infections, discuss it with your healthcare provider.

## 2023-06-23 ENCOUNTER — TELEMEDICINE (OUTPATIENT)
Dept: FAMILY MEDICINE CLINIC | Facility: CLINIC | Age: 43
End: 2023-06-23

## 2023-06-23 ENCOUNTER — NURSE TRIAGE (OUTPATIENT)
Dept: FAMILY MEDICINE CLINIC | Facility: CLINIC | Age: 43
End: 2023-06-23

## 2023-06-23 DIAGNOSIS — R21 RASH: Primary | ICD-10-CM

## 2023-06-23 RX ORDER — TRIAMCINOLONE ACETONIDE 1 MG/G
CREAM TOPICAL
Qty: 60 G | Refills: 0 | Status: SHIPPED | OUTPATIENT
Start: 2023-06-23

## 2023-11-20 ENCOUNTER — NURSE TRIAGE (OUTPATIENT)
Dept: FAMILY MEDICINE CLINIC | Facility: CLINIC | Age: 43
End: 2023-11-20

## 2023-11-20 NOTE — TELEPHONE ENCOUNTER
Action Requested: Summary for Provider     []  Critical Lab, Recommendations Needed  [] Need Additional Advice  []   FYI    []   Need Orders  [] Need Medications Sent to Pharmacy  []  Other     SUMMARY: Per protocol advised : Office visit within 2 weeks   Future Appointments   Date Time Provider Adolfo Izaguirre   2023  3:40 PM Sandeep Garcia MD [address] [place] [address]   2024  8:00 AM Sandeep Garcia MD [address] [place] [address]           Reason for call: Acute  Onset: Data Unavailable    Called patient in regards to message below ( identified name and  )  states having symptoms \" feeling off' for the past month,  Eye  lid get swollen,  randomly lips tingling at times, \"\"bad \" headaches,  she is unsure if these symptoms are related or not   Has tried Advil and ASA for headache with some relief     Denies headache, today, no SOB     See care advice given     Patient verbalizes understanding and agrees with plan.      Reason for Disposition   Nursing judgment    Protocols used: No Protocol [id number]

## 2023-11-30 ENCOUNTER — OFFICE VISIT (OUTPATIENT)
Dept: FAMILY MEDICINE CLINIC | Facility: CLINIC | Age: 43
End: 2023-11-30
Payer: COMMERCIAL

## 2023-11-30 VITALS
SYSTOLIC BLOOD PRESSURE: 140 MMHG | BODY MASS INDEX: 35 KG/M2 | HEART RATE: 88 BPM | WEIGHT: 154 LBS | DIASTOLIC BLOOD PRESSURE: 92 MMHG

## 2023-11-30 DIAGNOSIS — I10 PRIMARY HYPERTENSION: ICD-10-CM

## 2023-11-30 DIAGNOSIS — R07.9 CHEST PAIN, UNSPECIFIED TYPE: Primary | ICD-10-CM

## 2023-11-30 LAB
ALBUMIN SERPL-MCNC: 4.4 G/DL (ref 3.2–4.8)
ALBUMIN/GLOB SERPL: 1.5 {RATIO} (ref 1–2)
ALP LIVER SERPL-CCNC: 65 U/L
ALT SERPL-CCNC: 33 U/L
ANION GAP SERPL CALC-SCNC: 7 MMOL/L (ref 0–18)
AST SERPL-CCNC: 23 U/L (ref ?–34)
ATRIAL RATE: 84 BPM
BILIRUB SERPL-MCNC: 0.5 MG/DL (ref 0.3–1.2)
BUN BLD-MCNC: 8 MG/DL (ref 9–23)
BUN/CREAT SERPL: 11.8 (ref 10–20)
CALCIUM BLD-MCNC: 9.6 MG/DL (ref 8.7–10.4)
CHLORIDE SERPL-SCNC: 106 MMOL/L (ref 98–112)
CO2 SERPL-SCNC: 25 MMOL/L (ref 21–32)
CREAT BLD-MCNC: 0.68 MG/DL
EGFRCR SERPLBLD CKD-EPI 2021: 109 ML/MIN/1.73M2 (ref 60–?)
FASTING STATUS PATIENT QL REPORTED: NO
GLOBULIN PLAS-MCNC: 2.9 G/DL (ref 2.8–4.4)
GLUCOSE BLD-MCNC: 97 MG/DL (ref 70–99)
OSMOLALITY SERPL CALC.SUM OF ELEC: 284 MOSM/KG (ref 275–295)
P AXIS: 66 DEGREES
P-R INTERVAL: 156 MS
POTASSIUM SERPL-SCNC: 3.9 MMOL/L (ref 3.5–5.1)
PROT SERPL-MCNC: 7.3 G/DL (ref 5.7–8.2)
Q-T INTERVAL: 362 MS
QRS DURATION: 76 MS
QTC CALCULATION (BEZET): 427 MS
R AXIS: 28 DEGREES
SODIUM SERPL-SCNC: 138 MMOL/L (ref 136–145)
T AXIS: 38 DEGREES
VENTRICULAR RATE: 84 BPM

## 2023-11-30 PROCEDURE — 80053 COMPREHEN METABOLIC PANEL: CPT | Performed by: FAMILY MEDICINE

## 2023-11-30 RX ORDER — AMLODIPINE BESYLATE 10 MG/1
10 TABLET ORAL DAILY
Qty: 90 TABLET | Refills: 3 | Status: SHIPPED | OUTPATIENT
Start: 2023-11-30

## 2023-12-13 RX ORDER — AMLODIPINE BESYLATE 5 MG/1
5 TABLET ORAL DAILY
Qty: 90 TABLET | Refills: 3 | OUTPATIENT
Start: 2023-12-13

## 2024-01-24 ENCOUNTER — TELEPHONE (OUTPATIENT)
Dept: CASE MANAGEMENT | Age: 44
End: 2024-01-24

## 2024-01-24 NOTE — TELEPHONE ENCOUNTER
Patient has Physical scheduled for 2/21/24 with Dr. Hayden, will get all orders and referrals and visit.

## 2024-01-24 NOTE — TELEPHONE ENCOUNTER
Dr. Hayden,     Patient is requesting an order for a mammogram and the name of the specialist you recommend she see for a future colonoscopy.     Patient has [employer] PPO plan.     Thank you.  Kennedi Azar  [facility]

## 2024-02-28 ENCOUNTER — OFFICE VISIT (OUTPATIENT)
Dept: FAMILY MEDICINE CLINIC | Facility: CLINIC | Age: 44
End: 2024-02-28

## 2024-02-28 ENCOUNTER — LAB ENCOUNTER (OUTPATIENT)
Dept: LAB | Age: 44
End: 2024-02-28
Attending: FAMILY MEDICINE
Payer: COMMERCIAL

## 2024-02-28 VITALS
HEART RATE: 54 BPM | BODY MASS INDEX: 33.74 KG/M2 | DIASTOLIC BLOOD PRESSURE: 84 MMHG | SYSTOLIC BLOOD PRESSURE: 125 MMHG | HEIGHT: 56 IN | WEIGHT: 150 LBS

## 2024-02-28 DIAGNOSIS — Z12.11 COLON CANCER SCREENING: ICD-10-CM

## 2024-02-28 DIAGNOSIS — Z12.31 ENCOUNTER FOR SCREENING MAMMOGRAM FOR MALIGNANT NEOPLASM OF BREAST: ICD-10-CM

## 2024-02-28 DIAGNOSIS — Z00.00 ROUTINE HEALTH MAINTENANCE: Primary | ICD-10-CM

## 2024-02-28 LAB
BASOPHILS # BLD AUTO: 0.07 X10(3) UL (ref 0–0.2)
BASOPHILS NFR BLD AUTO: 1.1 %
CHOLEST SERPL-MCNC: 167 MG/DL (ref ?–200)
DEPRECATED RDW RBC AUTO: 41.2 FL (ref 35.1–46.3)
EOSINOPHIL # BLD AUTO: 0.19 X10(3) UL (ref 0–0.7)
EOSINOPHIL NFR BLD AUTO: 3.1 %
ERYTHROCYTE [DISTWIDTH] IN BLOOD BY AUTOMATED COUNT: 13.2 % (ref 11–15)
FASTING PATIENT LIPID ANSWER: YES
HCT VFR BLD AUTO: 41.5 %
HDLC SERPL-MCNC: 40 MG/DL (ref 40–59)
HGB BLD-MCNC: 14 G/DL
IMM GRANULOCYTES # BLD AUTO: 0.01 X10(3) UL (ref 0–1)
IMM GRANULOCYTES NFR BLD: 0.2 %
LDLC SERPL CALC-MCNC: 112 MG/DL (ref ?–100)
LYMPHOCYTES # BLD AUTO: 2.14 X10(3) UL (ref 1–4)
LYMPHOCYTES NFR BLD AUTO: 34.6 %
MCH RBC QN AUTO: 28.6 PG (ref 26–34)
MCHC RBC AUTO-ENTMCNC: 33.7 G/DL (ref 31–37)
MCV RBC AUTO: 84.9 FL
MONOCYTES # BLD AUTO: 0.53 X10(3) UL (ref 0.1–1)
MONOCYTES NFR BLD AUTO: 8.6 %
NEUTROPHILS # BLD AUTO: 3.24 X10 (3) UL (ref 1.5–7.7)
NEUTROPHILS # BLD AUTO: 3.24 X10(3) UL (ref 1.5–7.7)
NEUTROPHILS NFR BLD AUTO: 52.4 %
NONHDLC SERPL-MCNC: 127 MG/DL (ref ?–130)
PLATELET # BLD AUTO: 317 10(3)UL (ref 150–450)
RBC # BLD AUTO: 4.89 X10(6)UL
TRIGL SERPL-MCNC: 78 MG/DL (ref 30–149)
VLDLC SERPL CALC-MCNC: 13 MG/DL (ref 0–30)
WBC # BLD AUTO: 6.2 X10(3) UL (ref 4–11)

## 2024-02-28 PROCEDURE — 85025 COMPLETE CBC W/AUTO DIFF WBC: CPT | Performed by: FAMILY MEDICINE

## 2024-02-28 PROCEDURE — 99396 PREV VISIT EST AGE 40-64: CPT | Performed by: FAMILY MEDICINE

## 2024-02-28 PROCEDURE — 36415 COLL VENOUS BLD VENIPUNCTURE: CPT | Performed by: FAMILY MEDICINE

## 2024-02-28 PROCEDURE — 3074F SYST BP LT 130 MM HG: CPT | Performed by: FAMILY MEDICINE

## 2024-02-28 PROCEDURE — 3079F DIAST BP 80-89 MM HG: CPT | Performed by: FAMILY MEDICINE

## 2024-02-28 PROCEDURE — 80061 LIPID PANEL: CPT | Performed by: FAMILY MEDICINE

## 2024-02-28 PROCEDURE — 3008F BODY MASS INDEX DOCD: CPT | Performed by: FAMILY MEDICINE

## 2024-02-28 RX ORDER — CARVEDILOL 6.25 MG
TABLET ORAL
COMMUNITY
Start: 2024-01-15

## 2024-02-28 RX ORDER — SCOLOPAMINE TRANSDERMAL SYSTEM 1 MG/1
1 PATCH, EXTENDED RELEASE TRANSDERMAL
Qty: 7 PATCH | Refills: 0 | Status: SHIPPED | OUTPATIENT
Start: 2024-02-28

## 2024-02-28 NOTE — PROGRESS NOTES
Letitia Carranza is a 43 year old female.  Chief Complaint   Patient presents with    Routine Physical       HPI:   Patient is a 43-year-old female presents today for her routine health physical.  Patient due for colonoscopy and mammogram.  Patient saw cardiology had a negative cardiac workup.  They stopped her amlodipine due to foot swelling and started Coreg.    Current Outpatient Medications   Medication Sig Dispense Refill    COREG 6.25 MG Oral Tab Coreg 6.25 mg tablet, [RxNorm: 033753]      Scopolamine 1.5mg TD patch 1mg/3days Place 1 patch onto the skin every third day. 7 patch 0    triamcinolone 0.1 % External Cream Use to affected area twice daily x 2 weeks. Stop for one week. Repeat as needed 60 g 0    fluticasone propionate 50 MCG/ACT Nasal Suspension 2 sprays by Nasal route 2 (two) times daily.      valACYclovir 1 G Oral Tab Take 1 tablet (1,000 mg total) by mouth As Directed. 21 tablet 0      Past Medical History:   Diagnosis Date    Congenital adrenal hyperplasia (HCC)     Infertility, female     Pregnancy-induced hypertension (HCC)       Past Surgical History:   Procedure Laterality Date          Hernia surgery       Hernia surgery        Social History:  Social History     Socioeconomic History    Marital status:    Tobacco Use    Smoking status: Never    Smokeless tobacco: Never   Vaping Use    Vaping Use: Never used   Substance and Sexual Activity    Alcohol use: Yes     Alcohol/week: 0.0 standard drinks of alcohol     Comment: Seldom.     Drug use: No    Sexual activity: Yes     Partners: Male        REVIEW OF SYSTEMS:   GENERAL HEALTH: No fevers, chills, sweats, fatigue  VISION: No recent vision problems, blurry vision or double vision  HEENT: No decreased hearing ear pain nasal congestion or sore throat  SKIN: denies any unusual skin lesions or rashes  RESPIRATORY: denies shortness of breath, cough, wheezing  CARDIOVASCULAR: denies chest pain on exertion,  palpitations, swelling in feet  GI: denies abdominal pain and denies heartburn, nausea or vomiting  : No Pain on urination, change in the color of urine, discharge, urinating frequently  MUS: No back pain, joint pain, muscle pain  NEURO: denies headaches , anxiety, depression    EXAM:   /84 (BP Location: Left arm, Patient Position: Sitting, Cuff Size: adult)   Pulse 54   Ht 4' 8\" (1.422 m)   Wt 150 lb (68 kg)   LMP 02/26/2024 (Approximate)   BMI 33.63 kg/m²   GENERAL: well developed, well nourished,in no apparent distress  SKIN: no rashes,no suspicious lesions  HEENT: atraumatic, normocephalic,ears and throat are clear,   NECK: supple,no adenopathy,  LUNGS: clear to auscultation, no wheeze  CARDIO: RRR without murmur  GI: good BS's,no masses or tenderness  EXTREMITIES: no cyanosis, or edema    ASSESSMENT AND PLAN:   Referral given appointment made 1. Encounter for screening mammogram for malignant neoplasm of breast    - [facility] RENE 2D+3D SCREENING BILAT (CPT=77067/19022); Future    2. Colon cancer screening  Referral given appointment made  - [facility] Telephone Colon Screen; Future    3. Routine health maintenance  Discussed diet and exercise with patient discussed today added strength and yoga into her routine.  Will do a CBC and a lipid panel.  Did a comprehensive panel in November which was  - CBC With Differential With Platelet  - Lipid Panel       The patient indicates understanding of these issues and agrees to the plan.  No follow-ups on file.

## 2024-03-05 ENCOUNTER — NURSE ONLY (OUTPATIENT)
Facility: CLINIC | Age: 44
End: 2024-03-05

## 2024-03-05 DIAGNOSIS — Z12.11 COLON CANCER SCREENING: Primary | ICD-10-CM

## 2024-03-05 NOTE — PROGRESS NOTES
GI Staff:  [facility] Colon Screening Orders    Please schedule: Colonoscopy 26018 / 44333 with MAC OR IV (if appropriate)    Please send split dose Golytely bowel prep     Diagnosis: Colon Screening Z12.11     Medication adjustments:none  Day before procedure, hold:none  Day of procedure, hold: none    >>>Please inform patient if new medications are started after scheduling procedure they need to call clinic to notify us.

## 2024-03-05 NOTE — PROGRESS NOTES
Called patient for scheduled telephone colon screening.  Medications, pharmacy, and allergies verified with the patient.     Age 45-64 y/o (Y/N):   66-74 y/o ? Route to GI provider per rotation schedule   › GI MD preference: none  › Insurance:  BC/BS PPO  › Last PCP Visit: 11/20/2023  › H/W/BMI: 4'8/150/33.65    Special comments/notes:  Recall details:     Last Procedure, Date, MD:   Never had a colonoscopy   Last diagnosis:    Recalled for (mth/yrs):    Sedation used previously:    Last Prep Used:    Quality of Prep:      Telephone colon screening Questionnaires:  Yes No   Are you currently experiencing any new GI symptoms? [] [x]   If yes, symptom details:     Rectal Bleeding with or without bowel movements: [] [x]   Black stool: [] [x]   Dysphagia &Food feeling/getting stuck: [] [x]   Intractable Vomiting: [] [x]   Unexplained weight loss: [] [x]   First colonoscopy? [x] []   Family history of colon cancer? [] [x]   Any issues with anesthesia? [] [x]   If yes, explain details:      Personal history of Resp. Issues/Oxygen Use/ANDREA/COPD? [] [x]   If yes, CPAP/BiPAP? [] []   History of devices Pacemaker/Defibrillator/Stents? [] [x]   History of Cardiac/CVA issues/(MI/Stroke):  [] [x]     Medication usage:  Yes  No   Anticoagulants:  Anticoagulant (Except Aspirin) ? Route to RN staff to obtain ordering provider orders [] [x]   Diabetic Meds:   PO DM Meds ? Hold day prior and day of procedure  Insulin ? Route to RN clinical staff to obtain provider orders  [] [x]   Weight loss meds (phentermine/Vyvanse/Saxsenda): [] [x]   Iron/Herbal/Multivitamin Supplement (RX/OTC): [] [x]   Usage of marijuana, CBD &/or vape products: [] [x]

## 2024-03-05 NOTE — PROGRESS NOTES
Scheduled for:  Colonoscopy 58992     Location:  [facility] ([facility])  Provider:  Dr Bonilla    Date:  6/7/2024  Time:   10:45AM  (Patient made aware [facility] will call the day before with an arrival time)    Sedation:  MAC  Prep:  Split GoLytely    Diagnosis with codes:    ICD-10-CM   1. Colon cancer screening  Z12.11        Meds/Allergies Reconciled?:   Provider Reviewed   Was patient informed to call insurance with codes (Y/N):  Yes  Referral sent?: Referral was sent at the time of electronic surgical scheduling.  [facility] or [facility] notified?: I sent an electronic request to [facility] Scheduling and received a confirmation today.     Medication Orders:  Patient is aware to NOT take iron pills, herbal meds and diet supplements for 7 days before exam. Also to NOT take any form of alcohol, recreational drugs and any forms of ED meds 24 hours before exam.       Misc Orders:  N/A   Further instructions given by staff:  I Provided prep instructions to patient and reviewed date, time and location. Patient verbalized that  She / Her understood and is aware to call with any questions.  Patient was informed about the new cancellation policy for She / Her procedure. Patient was also given copy of the cancellation policy at the time of the appointment and verbalized understanding.

## 2024-03-19 NOTE — TELEPHONE ENCOUNTER
2-21-18 [facility] US report to Dr Ramos Wood folder and brown folddelmi Patient A&O x4, assisted via wheelchair upon ED arrival. Patient concerned that she is having sciatica issues. Patient reporting left knee pain that started earlier today - denies having injury/trauma. Patient otherwise denies having left lower back pain.

## 2024-03-22 RX ORDER — VALACYCLOVIR HYDROCHLORIDE 1 G/1
1000 TABLET, FILM COATED ORAL AS DIRECTED
Qty: 21 TABLET | Refills: 0 | Status: SHIPPED | OUTPATIENT
Start: 2024-03-22

## 2024-03-22 NOTE — TELEPHONE ENCOUNTER
Refill passed per [facility] protocol.     Requested Prescriptions   Pending Prescriptions Disp Refills    valACYclovir 1 G Oral Tab 21 tablet 0     Sig: Take 1 tablet (1,000 mg total) by mouth As Directed.       Herpes Agent Protocol Passed - 3/22/2024  7:33 AM        Passed - In person appointment or virtual visit in the past 12 mos or appointment in next 3 mos     Recent Outpatient Visits              2 weeks ago Colon cancer screening    [facility]    Nurse Only    3 weeks ago Routine health maintenance    [facility] Donya Hayden MD    Office Visit    3 months ago Chest pain, unspecified type    [facility] Donya Hayden MD    Office Visit    9 months ago [facility]    [facility]icine    1 year ago Sore throat    [facility]    [facility] Visit          Future Appointments         Provider Department Appt Notes    In 4 days [address]     In 2 months STATHOPOULOS, PROCEDURE [facility] Colonoscopy with MAC @ [facility]

## 2024-03-25 NOTE — TELEPHONE ENCOUNTER
valACYclovir 1 G Oral Tab, Take 1 tablet (1,000 mg total) by mouth As Directed., Disp: 21 tablet, Rfl: 0    Message: Please clarify frequency

## 2024-03-25 NOTE — TELEPHONE ENCOUNTER
Dr. Hayden, please clarify sig.  Thanks    New rx pended.    Medication Quantity Refills Start End   valACYclovir 1 G Oral Tab 21 tablet 0 3/22/2024 --   Sig:   Take 1 tablet (1,000 mg total) by mouth As Directed.

## 2024-03-26 ENCOUNTER — HOSPITAL ENCOUNTER (OUTPATIENT)
Dept: MAMMOGRAPHY | Age: 44
Discharge: HOME | End: 2024-03-26
Attending: FAMILY MEDICINE
Payer: COMMERCIAL

## 2024-03-26 DIAGNOSIS — Z12.31 ENCOUNTER FOR SCREENING MAMMOGRAM FOR MALIGNANT NEOPLASM OF BREAST: ICD-10-CM

## 2024-03-26 PROCEDURE — 77067 SCR MAMMO BI INCL CAD: CPT | Performed by: FAMILY MEDICINE

## 2024-03-26 PROCEDURE — 77063 BREAST TOMOSYNTHESIS BI: CPT | Performed by: FAMILY MEDICINE

## 2024-03-26 RX ORDER — VALACYCLOVIR HYDROCHLORIDE 1 G/1
1000 TABLET, FILM COATED ORAL EVERY 12 HOURS SCHEDULED
Qty: 21 TABLET | Refills: 0 | Status: SHIPPED | OUTPATIENT
Start: 2024-03-26

## 2024-04-23 RX ORDER — VALACYCLOVIR HYDROCHLORIDE 1 G/1
1000 TABLET, FILM COATED ORAL EVERY 12 HOURS SCHEDULED
Qty: 21 TABLET | Refills: 0 | Status: SHIPPED | OUTPATIENT
Start: 2024-04-23

## 2024-04-23 NOTE — TELEPHONE ENCOUNTER
REFILL PASSED PER [facility] PROTOCOLS    Requested Prescriptions   Pending Prescriptions Disp Refills    valACYclovir 1 G Oral Tab 21 tablet 0     Sig: Take 1 tablet (1,000 mg total) by mouth every 12 (twelve) hours.       Herpes Agent Protocol Passed - 4/21/2024  3:47 PM        Passed - In person appointment or virtual visit in the past 12 mos or appointment in next 3 mos     Recent Outpatient Visits              1 month ago Colon cancer screening    [facility]    Nurse Only    1 month ago Routine health maintenance    [facility] Donya Lopez MD    Office Visit    4 months ago Chest pain, unspecified type    [facility] Donya Hayden MD    Office Visit    10 months ago Katelin    [facility], Urmila Lindsey APRN    Telemedicine    1 year ago Sore throat    [facility], Urmila Lindsey APRN    Office Visit          Future Appointments         Provider Department Appt Notes    In 1 month STATHOPOULOS, PROCEDURE [facility] Colonoscopy with MAC @ [facility]                         Future Appointments         Provider Department Appt Notes    In 1 month STATHOPOULOS, PROCEDURE [facility] Colonoscopy with MAC @ [facility]          Recent Outpatient Visits              1 month ago Colon cancer screening    [facility]    Nurse Only    1 month ago Routine health maintenance    [facility] Donya Lopez MD    Office Visit    4 months ago Chest pain, unspecified type    [facility] Donya Lopez MD    Office Visit    10 months ago [facility],  Urmila Lindsey APRN    Telemedicine    1 year ago Sore throat    [facility], [address], Urmila Lindsey APRN    Office Visit

## 2024-04-23 NOTE — TELEPHONE ENCOUNTER
REFILL PASSED PER [facility] PROTOCOLS    Requested Prescriptions   Pending Prescriptions Disp Refills    valACYclovir 1 G Oral Tab 21 tablet 0     Sig: Take 1 tablet (1,000 mg total) by mouth every 12 (twelve) hours.       Herpes Agent Protocol Passed - 4/21/2024  3:47 PM        Passed - In person appointment or virtual visit in the past 12 mos or appointment in next 3 mos     Recent Outpatient Visits              1 month ago Colon cancer screening    [facility]    Nurse Only    1 month ago Routine health maintenance    [facility] Donya Lopez MD    Office Visit    4 months ago Chest pain, unspecified type    [facility] Donya Hayden MD    Office Visit    10 months ago Katelin    [facility], Urmila Lindsey APRN    Telemedicine    1 year ago Sore throat    [facility], Urmila Lindsey APRN    Office Visit          Future Appointments         Provider Department Appt Notes    In 1 month STATHOPOULOS, PROCEDURE [facility] Colonoscopy with MAC @ [facility]                         Future Appointments         Provider Department Appt Notes    In 1 month STATHOPOULOS, PROCEDURE [facility] Colonoscopy with MAC @ [facility]          Recent Outpatient Visits              1 month ago Colon cancer screening    [facility]    Nurse Only    1 month ago Routine health maintenance    [facility] Donya Lopez MD    Office Visit    4 months ago Chest pain, unspecified type    [facility] Donya Lopez MD    Office Visit    10 months ago [facility],  Urmila Lindsey APRN    Telemedicine    1 year ago Sore throat    [facility], [address], Urmila Lindsey APRN    Office Visit

## 2024-04-23 NOTE — TELEPHONE ENCOUNTER
Please review.   Patient received 3/29/2024 for 10 day supply.    Requested Prescriptions   Pending Prescriptions Disp Refills    valACYclovir 1 G Oral Tab 21 tablet 0     Sig: Take 1 tablet (1,000 mg total) by mouth every 12 (twelve) hours.       Herpes Agent Protocol Passed - 4/21/2024  3:47 PM        Passed - In person appointment or virtual visit in the past 12 mos or appointment in next 3 mos     Recent Outpatient Visits              1 month ago Colon cancer screening    [facility]    Nurse Only    1 month ago Routine health maintenance    [facility] Donya Lopez MD    Office Visit    4 months ago Chest pain, unspecified type    [facility] Donya Lopez MD    Office Visit    10 months ago Katelin    [facility], [address], Urmila Lindsey APRN    Telemedicine    1 year ago Sore throat    [facility]    Office Visit          Future Appointments         Provider Department Appt Notes    In 1 month STATHOPOULOS, PROCEDURE [facility] Colonoscopy with MAC @ [facility]                           Future Appointments         Provider Department Appt Notes    In 1 month STATHOPOULOS, PROCEDURE [facility] Colonoscopy with MAC @ [facility]          Recent Outpatient Visits              1 month ago [facility] cancer screening    [facility]    [facility]urse Only    1 month ago Routine [facility] maintenance    [facility] Donya Montoya MD    Office Visit    4 months ago Chest pain, unspecified type    [facility] Donya Lopez MD    Office Visit    10 months ago Katelin    [facility],  [place], Urmila Lindsey APRN    Telemedicine    1 year ago Sore throat    [facility], [address], Urmila Lindsey APRN    Office Visit

## 2024-05-30 ENCOUNTER — TELEPHONE (OUTPATIENT)
Facility: CLINIC | Age: 44
End: 2024-05-30

## 2024-05-30 PROBLEM — E55.9 VITAMIN D DEFICIENCY: Status: ACTIVE | Noted: 2020-12-09

## 2024-05-30 PROBLEM — B00.9 HERPES SIMPLEX: Status: ACTIVE | Noted: 2024-05-30

## 2024-05-30 PROBLEM — R94.31 ABNORMAL EKG: Status: ACTIVE | Noted: 2022-09-19

## 2024-05-30 PROBLEM — R07.9 CHEST PAIN, UNSPECIFIED: Status: ACTIVE | Noted: 2023-12-08

## 2024-05-30 PROBLEM — R21 SKIN ERUPTION: Status: ACTIVE | Noted: 2024-05-30

## 2024-05-30 PROBLEM — K42.9 UMBILICAL HERNIA: Status: ACTIVE | Noted: 2024-05-30

## 2024-05-30 PROBLEM — O13.9 TRANSIENT HYPERTENSION OF PREGNANCY, ANTEPARTUM (HCC): Status: ACTIVE | Noted: 2018-05-02

## 2024-05-30 PROBLEM — R73.09 BLOOD GLUCOSE ABNORMAL: Status: ACTIVE | Noted: 2019-09-04

## 2024-05-30 PROBLEM — O09.813 PREGNANCY RESULTING FROM IN VITRO FERTILIZATION IN THIRD TRIMESTER (HCC): Status: ACTIVE | Noted: 2018-05-02

## 2024-06-07 PROCEDURE — 88305 TISSUE EXAM BY PATHOLOGIST: CPT | Performed by: INTERNAL MEDICINE

## 2024-06-11 ENCOUNTER — TELEPHONE (OUTPATIENT)
Facility: CLINIC | Age: 44
End: 2024-06-11

## 2024-06-11 NOTE — TELEPHONE ENCOUNTER
Health maintenance updated.  5 year colonoscopy recall placed in patient outreach.Next due on 06/07/2029 per Dr. Bonilla.

## 2024-10-17 ENCOUNTER — LAB ENCOUNTER (OUTPATIENT)
Dept: LAB | Age: 44
End: 2024-10-17
Attending: ALLERGY & IMMUNOLOGY
Payer: COMMERCIAL

## 2024-10-17 ENCOUNTER — NURSE ONLY (OUTPATIENT)
Dept: ALLERGY | Facility: CLINIC | Age: 44
End: 2024-10-17

## 2024-10-17 ENCOUNTER — OFFICE VISIT (OUTPATIENT)
Dept: ALLERGY | Facility: CLINIC | Age: 44
End: 2024-10-17
Payer: COMMERCIAL

## 2024-10-17 VITALS — HEIGHT: 56 IN | BODY MASS INDEX: 36.67 KG/M2 | WEIGHT: 163 LBS

## 2024-10-17 DIAGNOSIS — Z88.0 PENICILLIN ALLERGY: ICD-10-CM

## 2024-10-17 DIAGNOSIS — J30.89 SEASONAL AND PERENNIAL ALLERGIC RHINOCONJUNCTIVITIS: Primary | ICD-10-CM

## 2024-10-17 DIAGNOSIS — Z91.018 FOOD ALLERGY: ICD-10-CM

## 2024-10-17 DIAGNOSIS — J30.2 SEASONAL AND PERENNIAL ALLERGIC RHINOCONJUNCTIVITIS: Primary | ICD-10-CM

## 2024-10-17 DIAGNOSIS — Z23 NEED FOR COVID-19 VACCINE: ICD-10-CM

## 2024-10-17 DIAGNOSIS — J30.89 ENVIRONMENTAL AND SEASONAL ALLERGIES: Primary | ICD-10-CM

## 2024-10-17 DIAGNOSIS — Z23 FLU VACCINE NEED: ICD-10-CM

## 2024-10-17 DIAGNOSIS — H10.10 SEASONAL AND PERENNIAL ALLERGIC RHINOCONJUNCTIVITIS: Primary | ICD-10-CM

## 2024-10-17 PROCEDURE — 86003 ALLG SPEC IGE CRUDE XTRC EA: CPT

## 2024-10-17 PROCEDURE — 3008F BODY MASS INDEX DOCD: CPT | Performed by: ALLERGY & IMMUNOLOGY

## 2024-10-17 PROCEDURE — 95004 PERQ TESTS W/ALRGNC XTRCS: CPT | Performed by: ALLERGY & IMMUNOLOGY

## 2024-10-17 PROCEDURE — 36415 COLL VENOUS BLD VENIPUNCTURE: CPT

## 2024-10-17 PROCEDURE — 99204 OFFICE O/P NEW MOD 45 MIN: CPT | Performed by: ALLERGY & IMMUNOLOGY

## 2024-10-17 PROCEDURE — 95024 IQ TESTS W/ALLERGENIC XTRCS: CPT | Performed by: ALLERGY & IMMUNOLOGY

## 2024-10-17 NOTE — PATIENT INSTRUCTIONS
1 allergic rhinitis  See above clinical history  See above skin testing to screen for allergic triggers  Reviewed avoidance measures and potential treatment options of therapy  Patient with concern for antihistamines including Zyrtec causing weight gain  Reviewed potential trial of Flonase 2 sprays per nostril once a day or Astelin 2 sprays per nostril up to twice a day as an antihistamine nasal spray in place of Zyrtec.  Patient prefers using Astelin.  Denies current symptoms and would be looking more so for prescription, next March or April in the spring.  Patient may call for prescription at that time  May consider Singulair if refractory as it is antihistamine and steroid free      #2 history of penicillin allergy  Reviewed potential serum IgE testing to penicillin to further evaluate for an IgE mediated process  Reviewed 80% chance of outgrowing within 10 years of the previous episode.  Reviewed gold standard is oral challenge if testing is negative  Check serum IgE to penicillin.  Will call with results    #3 flu vaccine recommended and offered  Patient defers    #4 COVID vaccines reviewed.  Recommend booster.  Most recent booster available since September 2024.  Please check with local pharmacy as we do not stock the vaccine in office      #5 Food allergy  Concern for sesame seed causing GI symptoms including nausea.  Denies hives or swelling.  See above skin testing to screen for an IgE mediated food allergy.  Reviewed food allergies versus food intolerances.  If skin testing is negative probably more likely food intolerance.  If skin testing is positive will recommend to avoid  No history of systemic reactions or anaphylaxis           Orders This Visit:  Orders Placed This Encounter   Procedures    Penicillin V    Penicillin G

## 2024-10-17 NOTE — PROGRESS NOTES
Patient is a 43-year-old female Letitia Carranza is a 43 year old female.    HPI:     Chief Complaint   Patient presents with    Allergies     New patient.  Patient concerned swollen eyes, sneezing between April and October of each year.  She is, also, concerned with rash to forearms.        Patient is a 43-year-old female who presents for allergy consultation upon referral of her PCP, Dr. Alma Rosa Hayden with a chief complaint of allergies    Medications include triamcinolone and Flonase      Review of records notes penicillin allergy and seasonal allergies    Immunizations reviewed.  COVID-vaccine x  3 doses with last in 2021  Flu vaccine from 2022    Today patient reports  Allergies   Duration: a few years   Timing: worse spring thru   Symptoms: runny nose sz puffy eyes , rash on forearms: flat, itchy patch  bilateral inner forearms. No blisters or drainage   Can  last week   Severity: moderate   Status: no change to worsening   Triggers: allergies   Tried: zyrtec , TAC 0.1%   Some weight gain on zyrtec   Pets or smokers: 1 cat  Nonsmoker     Food: sesame : nausea   No hives or rashes     Hx of asthma, ad, or food allergy:denies        History of penicillin allergy: child , mom told her . No recall     Defers flu vaccine         HISTORY:  Past Medical History:    Congenital adrenal hyperplasia (HCC)    Herpes simplex    High blood pressure    Infertility, female    Pregnancy-induced hypertension (HCC)      Past Surgical History:   Procedure Laterality Date          Colonoscopy N/A 2024    Procedure: COLONOSCOPY;  Surgeon: Florencio Bonilla MD;  Location: [facility] MAIN OR    Hernia surgery       Hernia surgery        Family History   Problem Relation Age of Onset    Cancer Mother 49        Lung Cancer     Diabetes Mother     Breast Cancer Paternal Aunt         40S    Breast Cancer Paternal Aunt         40S    Breast Cancer Paternal Aunt         60S      Social History:    Social History     Socioeconomic History    Marital status:    Tobacco Use    Smoking status: Never     Passive exposure: Past    Smokeless tobacco: Never   Vaping Use    Vaping status: Never Used   Substance and Sexual Activity    Alcohol use: Yes     Alcohol/week: 0.0 standard drinks of alcohol     Comment: Seldom.     Drug use: No    Sexual activity: Yes     Partners: Male     Social Drivers of Health      [facility]    Social Connections    [facility]    Housing Stability        Medications (Active prior to today's visit):  Current Outpatient Medications   Medication Sig Dispense Refill    COREG 6.25 MG Oral Tab Coreg 6.25 mg tablet, [RxNorm: 817860]      triamcinolone 0.1 % External Cream Use to affected area twice daily x 2 weeks. Stop for one week. Repeat as needed 60 g 0    valACYclovir 1 G Oral Tab Take 1 tablet (1,000 mg total) by mouth every 12 (twelve) hours. (Patient not taking: Reported on 10/17/2024) 21 tablet 0    Scopolamine 1.5mg TD patch 1mg/3days Place 1 patch onto the skin every third day. (Patient not taking: Reported on 10/17/2024) 7 patch 0    fluticasone propionate 50 MCG/ACT Nasal Suspension 2 sprays by Nasal route 2 (two) times daily. (Patient not taking: Reported on 10/17/2024)         Allergies:  Allergies[1]      ROS:     Allergic/Immuno:  See HPI  Cardiovascular:  Negative for irregular heartbeat/palpitations, chest pain, edema  Constitutional:  Negative night sweats,weight loss, irritability and lethargy  Endocrine:  Negative for cold intolerance, polydipsia and polyphagia  ENMT:  Negative for ear drainage, hearing loss  see hpi   Eyes:  Negative for eye discharge and vision loss  Gastrointestinal:  Negative for abdominal pain, diarrhea and vomiting see hpi   Genitourinary:  Negative for dysuria and hematuria  Hema/Lymph:  Negative for easy  bleeding and easy bruising  Integumentary:  Negative for pruritus and rash  Musculoskeletal:  Negative for joint symptoms  Neurological:  Negative for dizziness, seizures  Psychiatric:  Negative for inappropriate interaction and psychiatric symptoms  Respiratory:  Negative for cough, dyspnea and wheezing      PHYSICAL EXAM:   Constitutional: responsive, no acute distress noted  Head/Face: NC/Atraumatic  Eyes/Vision: conjunctiva and lids are normal extraocular motion is intact   Ears/Audiometry: tympanic membranes are normal bilaterally hearing is grossly intact  Nose/Mouth/Throat: nose and throat are clear mucous membranes are moist   Neck/Thyroid: neck is supple without adenopathy  Lymphatic: no abnormal cervical, supraclavicular or axillary adenopathy is noted  Respiratory: normal to inspection lungs are clear to auscultation bilaterally normal respiratory effort   Cardiovascular: regular rate and rhythm no murmurs, gallups, or rubs  Abdomen: soft non-tender non-distended  Skin/Hair: no unusual rashes present  Extremities: no edema, cyanosis, or clubbing  Neurological:Oriented to time, place, person & situation       ASSESSMENT/PLAN:   Assessment   Encounter Diagnoses   Name Primary?    Seasonal and perennial allergic rhinoconjunctivitis Yes    Penicillin allergy     Flu vaccine need     Need for COVID-19 vaccine     Food allergy        Skin testing today to common indoor and outdoor environmental allergies was  + to trees. Rw. Weed. dm    Skin testing today to common food allergens including milk egg wheat soy almond walnut peanut and sesame seed was negative     Positive histamine control     #1 allergic rhinitis  See above clinical history  See above skin testing to screen for allergic triggers  Reviewed avoidance measures and potential treatment options of therapy  Patient with concern for antihistamines including Zyrtec causing weight gain  Reviewed potential trial of Flonase 2 sprays per nostril once a day  or Astelin 2 sprays per nostril up to twice a day as an antihistamine nasal spray in place of Zyrtec.  Patient prefers using Astelin.  Denies current symptoms and would be looking more so for prescription, next March or April in the spring.  Patient may call for prescription at that time  May consider Singulair if refractory as it is antihistamine and steroid free      #2 history of penicillin allergy  Reviewed potential serum IgE testing to penicillin to further evaluate for an IgE mediated process  Reviewed 80% chance of outgrowing within 10 years of the previous episode.  Reviewed gold standard is oral challenge if testing is negative  Check serum IgE to penicillin.  Will call with results    #3 flu vaccine recommended and offered  Patient defers    #4 COVID vaccines reviewed.  Recommend booster.  Most recent booster available since September 2024.  Please check with local pharmacy as we do not stock the vaccine in office      #5 Food allergy  Concern for sesame seed causing GI symptoms including nausea.  Denies hives or swelling.  See above skin testing to screen for an IgE mediated food allergy.  Reviewed food allergies versus food intolerances.  If skin testing is negative probably more likely food intolerance.  If skin testing is positive will recommend to avoid  No history of systemic reactions or anaphylaxis           Orders This Visit:  Orders Placed This Encounter   Procedures    Penicillin V    Penicillin G       Meds This Visit:  Requested Prescriptions      No prescriptions requested or ordered in this encounter       Imaging & Referrals:  None     10/17/2024  Calvin Nava MD      If medication samples were provided today, they were provided solely for patient education and training related to self administration of these medications.  Teaching, instruction and sample was provided to the patient by myself.  Teaching included  a review of potential adverse side effects as well as potential  efficacy.  Patient's questions were answered in regards to medication administration and dosing and potential side effects. Teaching was provided via the teach back method         [1]   Allergies  Allergen Reactions    Penicillins UNKNOWN     As a child

## 2024-10-22 ENCOUNTER — TELEPHONE (OUTPATIENT)
Dept: ALLERGY | Facility: CLINIC | Age: 44
End: 2024-10-22

## 2024-10-22 LAB
C001-IGE PENICILLOYL G: <0.1 KU/L
C002-IGE PENICILLOYL V: <0.1 KU/L

## 2024-10-22 NOTE — TELEPHONE ENCOUNTER
RN left message for patient to go over results listed below.  Provided call back number and office hours.      ----- Message from Calvin Nava sent at 10/22/2024  9:14 AM CDT -----  Please contact patient with negative serum IgE testing to penicillin V and penicillin G  Recommend oral challenge to further evaluate

## 2024-10-23 ENCOUNTER — TELEPHONE (OUTPATIENT)
Dept: ALLERGY | Facility: CLINIC | Age: 44
End: 2024-10-23

## 2024-10-23 NOTE — TELEPHONE ENCOUNTER
See "[email or web address] message sent to patient.         Dr. Elijah Pantoja reports the ICD 10 Codes for Allergy Injections are as below . . .     Calvin Nava MD Physician Signed 9:36 AM      Copy      [id number] to pay for the allergens themselves  [id number] for the nurse to draw up  and inject to the allergens.         After speaking with [employer] please send us a [employer] message if you would like to begin Allergy Injections.         Regarding Upcoming Penicillin Oral Challenge Appointment please . . .     - Refrain from taking any antihistamines for 5 days prior to the test     - Plan on the appointment being 2-3 hours in length     - Please call the Allergy Office 2 weeks prior to your scheduled appointment and ask that a prescription for Penicillin be sent to your pharmacy.  Please  the prescription and bring to the scheduled Oral Challenge appointment.      - If you become ill, please call the Allergy Office to reschedule the Oral Challenge appointment.  Of note, please be aware that you must speak with an Allergy RN to reschedule an oral challenge appointment.      Please reach out to the Allergy RNs via "[email or web address] or call the Allergy Office at [phone] with any concerns or questions.         Regards,     Morelia POWELL, RN

## 2024-10-23 NOTE — TELEPHONE ENCOUNTER
Patient contacted and given results and recommendations as per Dr. Nava below.       ----- Message from Calvin Nava sent at 10/22/2024  9:14 AM CDT -----  Please contact patient with negative serum IgE testing to penicillin V and penicillin G  Recommend oral challenge to further evaluate       Oral Challenge Appointment Explained:     - 2 hour appointment   - 2 weeks prior to the appointment, patient is to call Allergy Office to request that a prescription for Penicillin be sent to patient's pharmacy.  Patient to bring Penicillin tablet to the oral challenge appointment.  - Refrain from antihistamines for 5 days prior to the appointment in order to have an accurate testing.  - If Allergic reaction should occur, nurse will give medication to reverse the response.     Patient verbalized understanding of information.  She agreed to plan of care.       Oral Challenge to Penicillin appointment scheduled for 2/6/2025 at 1:30 pm.

## 2024-10-23 NOTE — TELEPHONE ENCOUNTER
56593 to pay for the allergens themselves  37069 for the nurse to draw up  and inject to the allergens.

## 2024-10-23 NOTE — TELEPHONE ENCOUNTER
Dr. Nava,     Patient reports that she is interested in beginning Allergy Injections.         Patient asked that nurse send through [employer] the ICD 10 codes for Allergy Injections as she will call [employer] to be sure that the services are covered.     Patient informed nurse will send above information via [employer] and will send Dr. Nava's recommendations via [employer] regarding beginning Allergy Injections.

## 2024-10-29 ENCOUNTER — OFFICE VISIT (OUTPATIENT)
Dept: ENDOCRINOLOGY CLINIC | Facility: CLINIC | Age: 44
End: 2024-10-29
Payer: COMMERCIAL

## 2024-10-29 VITALS
WEIGHT: 163 LBS | DIASTOLIC BLOOD PRESSURE: 76 MMHG | SYSTOLIC BLOOD PRESSURE: 128 MMHG | BODY MASS INDEX: 36.67 KG/M2 | HEART RATE: 65 BPM | HEIGHT: 56 IN

## 2024-10-29 DIAGNOSIS — E55.9 VITAMIN D DEFICIENCY: ICD-10-CM

## 2024-10-29 DIAGNOSIS — L68.0 HIRSUTISM: ICD-10-CM

## 2024-10-29 DIAGNOSIS — E25.9 CAH 21-OH (CONGENITAL ADRENAL HYPERPLASIA), LATE ONSET (HCC): Primary | ICD-10-CM

## 2024-10-29 DIAGNOSIS — Z13.1 DIABETES MELLITUS SCREENING: ICD-10-CM

## 2024-10-29 PROCEDURE — 3008F BODY MASS INDEX DOCD: CPT | Performed by: INTERNAL MEDICINE

## 2024-10-29 PROCEDURE — 3078F DIAST BP <80 MM HG: CPT | Performed by: INTERNAL MEDICINE

## 2024-10-29 PROCEDURE — 3074F SYST BP LT 130 MM HG: CPT | Performed by: INTERNAL MEDICINE

## 2024-10-29 PROCEDURE — 99204 OFFICE O/P NEW MOD 45 MIN: CPT | Performed by: INTERNAL MEDICINE

## 2024-10-29 NOTE — H&P
New Patient Evaluation - History and Physical    CONSULT - Reason for Visit: CAH, elevated BMI  Requesting Physician: Self referral    CHIEF COMPLAINT:    Chief Complaint   Patient presents with    Consult     Patient is in to establish care due to Congenital Adrenal Hyperplasia        HISTORY OF PRESENT ILLNESS:   Letitia Carranza is a 43 year old female who presents for evaluation of CAH and elevated BMI    History from patient and old notes under care everywhere  1999 when she was age 16 and developed problems with acne, hirsutism and irregular menses which prompted a workup. She was diagnosed with congenital adrenal hyperplasia by Dr Haque and was taking dexamethasone 0.25 mg daily for about 18 months but discontinued this on her own as she didn't think it was helping; she does not recall any improvement in her menses.     Many years later, She again noted an increase in her hirsutism, with hair under the chin, in between the breasts, on the lower abdomen and on the buttocks, in addition to difficulty losing weight and HTN. Labs at that time were consistent with congenital adrenal hyperplasia.: 17 OH progestrone 1004; testosterone 72;  DHEA-S 611; thyroid function tests, prolactin and FSH/LH normal. She was started on pred 5mg at HS with improvement. She stopped prednisone some time prior to 2018    She had a baby in 2018 via IVF.   Around this time she was started on thyroid medication, this was continued during pregnancy stopped after delivery     Reviewed notes from genetic counselor --> non classic CAH    FH family history of hirsutism, HTN and type 2 DM.    She reports difficulty in losing weight   She continues to have excess facial hair, is stable though. She threads/ waxes hair.   Hey cycles are mostly regular, but will skip 1-2 cycles a year  She is sexually active, partner uses condoms  No plans to have more children          PAST MEDICAL HISTORY:   Past Medical History:    Congenital adrenal  hyperplasia (HCC)    Herpes simplex    High blood pressure    Infertility, female    Pregnancy-induced hypertension (HCC)       PAST SURGICAL HISTORY:   Past Surgical History:   Procedure Laterality Date          Colonoscopy N/A 2024    Procedure: COLONOSCOPY;  Surgeon: Florencio Bonilla MD;  Location: [facility] MAIN OR    Hernia surgery       Hernia surgery         CURRENT MEDICATIONS:    Current Outpatient Medications   Medication Sig Dispense Refill    COREG 6.25 MG Oral Tab Coreg 6.25 mg tablet, [RxNorm: 245743]      triamcinolone 0.1 % External Cream Use to affected area twice daily x 2 weeks. Stop for one week. Repeat as needed 60 g 0       ALLERGIES:  Allergies[1]    SOCIAL HISTORY:    Social History     Socioeconomic History    Marital status:    Tobacco Use    Smoking status: Never     Passive exposure: Past    Smokeless tobacco: Never   Vaping Use    Vaping status: Never Used   Substance and Sexual Activity    Alcohol use: Yes     Alcohol/week: 0.0 standard drinks of alcohol     Comment: Seldom.     Drug use: No    Sexual activity: Yes     Partners: Male       FAMILY HISTORY:   Family History   Problem Relation Age of Onset    Cancer Mother 49        Lung Cancer     Diabetes Mother     Breast Cancer Paternal Aunt         40S    Breast Cancer Paternal Aunt         40S    Breast Cancer Paternal Aunt         60S       ASSESSMENTS:     REVIEW OF SYSTEMS:  Constitutional: Negative for: weight change, fever, fatigue, cold/heat intolerance  Eyes: Negative for:  Visual changes, proptosis, blurring  ENT: Negative for:  dysphagia, neck swelling, dysphonia  Respiratory: Negative for:  dyspnea, cough  Cardiovascular: Negative for:  chest pain, palpitations, orthopnea  GI: Negative for:  abdominal pain, nausea, vomiting, diarrhea, constipation, bleeding  Neurology: Negative for: headache, numbness, weakness  Genito-Urinary: Negative for: dysuria, frequency  Psychiatric: Negative for:   depression, anxiety  Hematology/Lymphatics: Negative for: bruising, lower extremity edema  Endocrine: Negative for: polyuria, polydypsia  Skin: Negative for: rash, blister, cellulitis,      PHYSICAL EXAM:   Vitals:    10/29/24 1249 10/29/24 1331   BP: 145/87 128/76   Pulse: 65    Weight: 163 lb (73.9 kg)    Height: 4' 8\" (1.422 m)      BMI: Body mass index is 36.54 kg/m².         General Appearance:  alert, well developed, in no acute distress  Head: Atraumatic  Eyes:  normal conjunctivae, sclera., normal sclera and normal pupils  Throat/Neck: normal sound to voice. Normal hearing, normal speech  Respiratory:  Speaking in full sentences, non-labored. no increased work of breathing, no audible wheezing    Neuro: motor grossly intact, moving all extremities without difficulty  Psychiatric:  oriented to time, self, and place  Extremities: no obvious extremity swelling, no lesions        DATA:     Pertinent data reviewed      ASSESSMENT AND PLAN:    Patient is a 43-year-old female with:  Nonclassic congenital adrenal hyperplasia   Main symptom at this time is hirsutism: Stable and under control with temporary hair removal methods. I reviewed spironolactone also  Cycles are more or less regular  She is not on any steroids  Will check labs as below      Body mass index of 36  Patient reports difficulty in losing weight and gaining weight despite lifestyle changes including eating healthy well-balanced meals  She is interested in weight loss medications.  I discussed GLP agonists, however patient states weight loss medications are not covered under her insurance plan  We discussed phentermine and topiramate with cash pay option patient would like to think about this  Medication was reviewed in detail including side effects and teratogenic nature  Of note patient has hypertension, is on blood pressure medication.  Manual blood pressure today is normal.  She will get back to me about this    History of vitamin D  deficiency  Will check vitamin D levels and replace accordingly    Diabetes screening  Will check an A1c      Labs between 7 to 8 AM, after fasting for 10 hours    Orders Placed This Encounter   Procedures    Dehydroepiandrosterone Sulfate    Testosterone Total    17-Alpha-Hydroxyprogesterone    Hemoglobin A1C [E]    Cortisol    ACTH, Plasma    Vitamin D [E]         10/29/2024  Sowmya Lange MD               [1]   Allergies  Allergen Reactions    Penicillins UNKNOWN     As a child

## 2024-11-01 ENCOUNTER — LAB ENCOUNTER (OUTPATIENT)
Dept: LAB | Age: 44
End: 2024-11-01
Attending: INTERNAL MEDICINE
Payer: COMMERCIAL

## 2024-11-01 DIAGNOSIS — L68.0 HIRSUTISM: ICD-10-CM

## 2024-11-01 DIAGNOSIS — E25.9 CAH 21-OH (CONGENITAL ADRENAL HYPERPLASIA), LATE ONSET (HCC): ICD-10-CM

## 2024-11-01 DIAGNOSIS — E55.9 VITAMIN D INSUFFICIENCY: Primary | ICD-10-CM

## 2024-11-01 DIAGNOSIS — Z13.1 DIABETES MELLITUS SCREENING: ICD-10-CM

## 2024-11-01 DIAGNOSIS — E55.9 VITAMIN D DEFICIENCY: ICD-10-CM

## 2024-11-01 LAB
CORTIS SERPL-MCNC: 18.7 UG/DL
DHEA-S SERPL-MCNC: 346.5 UG/DL
EST. AVERAGE GLUCOSE BLD GHB EST-MCNC: 108 MG/DL (ref 68–126)
HBA1C MFR BLD: 5.4 % (ref ?–5.7)
TESTOST SERPL-MCNC: 50.97 NG/DL
VIT D+METAB SERPL-MCNC: 28.8 NG/ML (ref 30–100)

## 2024-11-01 PROCEDURE — 83036 HEMOGLOBIN GLYCOSYLATED A1C: CPT

## 2024-11-01 PROCEDURE — 82627 DEHYDROEPIANDROSTERONE: CPT

## 2024-11-01 PROCEDURE — 84403 ASSAY OF TOTAL TESTOSTERONE: CPT

## 2024-11-01 PROCEDURE — 82024 ASSAY OF ACTH: CPT

## 2024-11-01 PROCEDURE — 82533 TOTAL CORTISOL: CPT

## 2024-11-01 PROCEDURE — 82306 VITAMIN D 25 HYDROXY: CPT

## 2024-11-01 PROCEDURE — 36415 COLL VENOUS BLD VENIPUNCTURE: CPT

## 2024-11-01 PROCEDURE — 84143 ASSAY OF 17-HYDROXYPREGNENO: CPT

## 2024-11-02 LAB — ACTH: 39.4 PG/ML

## 2024-11-04 LAB — 17-OH PROGESTERONE: 1015 NG/DL

## 2024-11-05 ENCOUNTER — PATIENT MESSAGE (OUTPATIENT)
Dept: ENDOCRINOLOGY CLINIC | Facility: CLINIC | Age: 44
End: 2024-11-05

## 2024-11-05 NOTE — TELEPHONE ENCOUNTER
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 Antiandrogens can be added after six months if the response to OCs is inadequate   She is not seeking fertility  Rx  She states that the hair growth is manageable with temporary hair removal methods    She will like to try phentermine and topiramate  She will send picture of pregnancy test   After that we can send prescription to   [facility] in [facility]--> preferred pharmacy    RTc in 3 months    Patient verbalized a complete  understanding of all of the above and did not have any further questions.

## 2024-11-08 RX ORDER — TOPIRAMATE 25 MG/1
25 TABLET, FILM COATED ORAL DAILY
Qty: 90 TABLET | Refills: 0 | Status: SHIPPED | OUTPATIENT
Start: 2024-11-08

## 2024-11-08 RX ORDER — PHENTERMINE HYDROCHLORIDE 15 MG/1
15 CAPSULE ORAL EVERY MORNING
Qty: 90 CAPSULE | Refills: 0 | Status: SHIPPED | OUTPATIENT
Start: 2024-11-08

## 2024-11-25 ENCOUNTER — PATIENT MESSAGE (OUTPATIENT)
Dept: ENDOCRINOLOGY CLINIC | Facility: CLINIC | Age: 44
End: 2024-11-25

## 2024-11-25 NOTE — TELEPHONE ENCOUNTER
Called the patient   Reviewed high BP readings  She will decrease phentermine to 8 mg daily.  Prescription sent.  She will notify me if this is not available.  She will continue to monitor her blood pressure and send me blood pressure readings in the next 1 week.  Discussed that her blood pressure does not go down, she should contact Dr. Hayden for further evaluation of blood pressure.  We will stop phentermine in that case till blood pressure normalizes.    Dr. Hayden:  FYI please let me know if you have any further recommendations and I am happy to pass this on to the patient.  She currently takes Coreg for high blood pressure.

## 2024-11-27 ENCOUNTER — TELEPHONE (OUTPATIENT)
Dept: ENDOCRINOLOGY CLINIC | Facility: CLINIC | Age: 44
End: 2024-11-27

## 2024-11-27 NOTE — TELEPHONE ENCOUNTER
Prior Authorization        Not listed:  Lomaira 8mg Tablets    KEY:  [id number]  Patient last name:  GILLIAN  :  11-3-1980

## 2024-12-02 NOTE — TELEPHONE ENCOUNTER
Spoke to pharmacist: patient picked up Lomaira (phentermine) 8mg on 11/25 using discount coupon from pharmacy - co-pay was $19.15

## 2024-12-03 ENCOUNTER — OFFICE VISIT (OUTPATIENT)
Dept: FAMILY MEDICINE CLINIC | Facility: CLINIC | Age: 44
End: 2024-12-03

## 2024-12-03 VITALS
RESPIRATION RATE: 18 BRPM | HEIGHT: 56 IN | OXYGEN SATURATION: 99 % | WEIGHT: 160 LBS | HEART RATE: 72 BPM | SYSTOLIC BLOOD PRESSURE: 123 MMHG | BODY MASS INDEX: 35.99 KG/M2 | DIASTOLIC BLOOD PRESSURE: 79 MMHG

## 2024-12-03 DIAGNOSIS — E66.09 CLASS 2 OBESITY DUE TO EXCESS CALORIES WITHOUT SERIOUS COMORBIDITY WITH BODY MASS INDEX (BMI) OF 36.0 TO 36.9 IN ADULT: ICD-10-CM

## 2024-12-03 DIAGNOSIS — E66.812 CLASS 2 OBESITY DUE TO EXCESS CALORIES WITHOUT SERIOUS COMORBIDITY WITH BODY MASS INDEX (BMI) OF 36.0 TO 36.9 IN ADULT: ICD-10-CM

## 2024-12-03 DIAGNOSIS — I10 ESSENTIAL HYPERTENSION: Primary | ICD-10-CM

## 2024-12-03 LAB
ATRIAL RATE: 66 BPM
P AXIS: 63 DEGREES
P-R INTERVAL: 166 MS
Q-T INTERVAL: 384 MS
QRS DURATION: 74 MS
QTC CALCULATION (BEZET): 402 MS
R AXIS: 12 DEGREES
T AXIS: 48 DEGREES
VENTRICULAR RATE: 66 BPM

## 2024-12-03 PROCEDURE — 3008F BODY MASS INDEX DOCD: CPT | Performed by: FAMILY MEDICINE

## 2024-12-03 PROCEDURE — 3074F SYST BP LT 130 MM HG: CPT | Performed by: FAMILY MEDICINE

## 2024-12-03 PROCEDURE — 99214 OFFICE O/P EST MOD 30 MIN: CPT | Performed by: FAMILY MEDICINE

## 2024-12-03 PROCEDURE — 93000 ELECTROCARDIOGRAM COMPLETE: CPT | Performed by: FAMILY MEDICINE

## 2024-12-03 PROCEDURE — 3078F DIAST BP <80 MM HG: CPT | Performed by: FAMILY MEDICINE

## 2024-12-03 RX ORDER — CARVEDILOL 12.5 MG/1
12.5 TABLET ORAL 2 TIMES DAILY WITH MEALS
Qty: 180 TABLET | Refills: 0 | Status: SHIPPED | OUTPATIENT
Start: 2024-12-03 | End: 2025-03-03

## 2024-12-03 NOTE — PROGRESS NOTES
HPI:    Letitia Carranza is a 44 year old female presents clinic with concerns regarding her blood pressure.  She has been taking carvedilol 6.25 mg twice daily for a few years.  She was recently started on phentermine by endocrinology for weight loss.  States that after starting it, she noticed elevated blood pressure readings.  Prior to that, she is not checking her blood pressure regularly, just when she did not feel well.  Had readings in the 130s to 150s/80s 200s.  Occasional headaches, chest pain.  Nothing consistent.  Patient stopped phentermine, still had elevated readings.  Currently asymptomatic.  Patient denies HAs, blurry vision, nausea, vomiting, CP, palpitations, dizziness, SOB, or other symptoms of concern.       HISTORY:  Past Medical History:    Congenital adrenal hyperplasia (HCC)    Herpes simplex    High blood pressure    Infertility, female    Pregnancy-induced hypertension (HCC)      Past Surgical History:   Procedure Laterality Date          Colonoscopy N/A 2024    Procedure: COLONOSCOPY;  Surgeon: Florencio Bonilla MD;  Location: [facility] MAIN OR    Hernia surgery       Hernia surgery        Family History   Problem Relation Age of Onset    Cancer Mother 49        Lung Cancer     Diabetes Mother     Breast Cancer Paternal Aunt         40S    Breast Cancer Paternal Aunt         40S    Breast Cancer Paternal Aunt         60S      Social History:   Social History     Socioeconomic History    Marital status:    Tobacco Use    Smoking status: Never     Passive exposure: Past    Smokeless tobacco: Never   Vaping Use    Vaping status: Never Used   Substance and Sexual Activity    Alcohol use: Yes     Alcohol/week: 0.0 standard drinks of alcohol     Comment: Seldom.     Drug use: No    Sexual activity: Yes     Partners: Male     Social Drivers of Health      [facility]    Social Connections     [facility]    Housing Stability        Medications (Active prior to today's visit):  Current Outpatient Medications   Medication Sig Dispense Refill    carvedilol 12.5 MG Oral Tab Take 1 tablet (12.5 mg total) by mouth 2 (two) times daily with meals. 180 tablet 0    Phentermine HCl 8 MG Oral Tab Take 8 mg by mouth daily. (Patient not taking: Reported on 12/3/2024) 30 tablet 0    topiramate 25 MG Oral Tab Take 1 tablet (25 mg total) by mouth daily. (Patient not taking: Reported on 12/3/2024) 90 tablet 0    triamcinolone 0.1 % External Cream Use to affected area twice daily x 2 weeks. Stop for one week. Repeat as needed (Patient not taking: Reported on 12/3/2024) 60 g 0       Allergies:  Allergies[1]      Depression Screening (PHQ-2/PHQ-9): Over the LAST 2 WEEKS                         ROS:   Review of Systems   All other systems reviewed and are negative.      PHYSICAL EXAM:     Vitals:    12/03/24 0914   BP: 123/79   BP Location: Left arm   Patient Position: Sitting   Cuff Size: adult   Pulse: 72   Resp: 18   SpO2: 99%   Weight: 160 lb (72.6 kg)   Height: 4' 8\" (1.422 m)     Physical Exam  Vitals reviewed.   Constitutional:       General: She is not in acute distress.  Cardiovascular:      Rate and Rhythm: Normal rate and regular rhythm.      Heart sounds: Normal heart sounds.   Pulmonary:      Effort: Pulmonary effort is normal. No respiratory distress.      Breath sounds: Normal breath sounds.   Neurological:      Mental Status: She is alert.   Psychiatric:         Mood and Affect: Mood normal.         ASSESSMENT/PLAN:   (I10) Essential hypertension  (primary encounter diagnosis)  (E66.812,  E66.09,  Z68.36) Class 2 obesity due to excess calories without serious comorbidity with body mass index (BMI) of 36.0 to 36.9 in adult  Plan: EKG In-Office [44049]  -Blood pressure, heart rate at goal today.  Patient has multiple consecutive elevated home  readings.  Carvedilol increased to 12.5 mg twice daily.  EKG done in clinic-Normal rate, sinus rhythm, normal axis. No ST/T wave abnormalities. Normal study.  To continue to hold phentermine for now.  Follow-up with PCP within the next 4 weeks.                 Responsible party/patient verbalized understanding of information discussed. No barriers to learning observed.            Orders This Visit:  Orders Placed This Encounter   Procedures    Fluzone trivalent vaccine, PF 0.5mL, 6mo+ (36661)       Meds This Visit:  Requested Prescriptions     Signed Prescriptions Disp Refills    carvedilol 12.5 MG Oral Tab 180 tablet 0     Sig: Take 1 tablet (12.5 mg total) by mouth 2 (two) times daily with meals.       Imaging & Referrals:  INFLUENZA VACCINE, TRI, PRESERV FREE, 0.5 ML  ELECTROCARDIOGRAM, COMPLETE     Chaperone offered at visit today.     The 21st Century cures Act makes medical notes like these available to patients in the interest of transparency.  However, be advised that this is a medical document.  It is intended as peer to peer communication.  It is written in medical language and may contain abbreviations or verbiage that are unfamiliar.  It may appear blunt or direct.  Medical documents are intended to carry relevant information, facts as evident, and the clinical opinion of the practitioner.      This note was created by [email or web address] voice recognition. Errors in content may be related to improper recognition by the system; efforts to review and correct have been done but errors may still exist. Please contact me with any questions.       12/3/2024  Bhavik Ro MD       [1]   Allergies  Allergen Reactions    Penicillins UNKNOWN     As a child

## 2025-01-08 ENCOUNTER — LAB ENCOUNTER (OUTPATIENT)
Dept: LAB | Age: 45
End: 2025-01-08
Attending: FAMILY MEDICINE
Payer: COMMERCIAL

## 2025-01-08 ENCOUNTER — OFFICE VISIT (OUTPATIENT)
Dept: FAMILY MEDICINE CLINIC | Facility: CLINIC | Age: 45
End: 2025-01-08
Payer: COMMERCIAL

## 2025-01-08 VITALS
TEMPERATURE: 98 F | OXYGEN SATURATION: 98 % | RESPIRATION RATE: 16 BRPM | WEIGHT: 160 LBS | BODY MASS INDEX: 36 KG/M2 | SYSTOLIC BLOOD PRESSURE: 122 MMHG | DIASTOLIC BLOOD PRESSURE: 86 MMHG | HEART RATE: 68 BPM

## 2025-01-08 DIAGNOSIS — R32 URINARY INCONTINENCE, UNSPECIFIED TYPE: ICD-10-CM

## 2025-01-08 DIAGNOSIS — I10 ESSENTIAL HYPERTENSION: Primary | ICD-10-CM

## 2025-01-08 DIAGNOSIS — I10 ESSENTIAL HYPERTENSION: ICD-10-CM

## 2025-01-08 LAB
ANION GAP SERPL CALC-SCNC: 6 MMOL/L (ref 0–18)
BUN BLD-MCNC: 10 MG/DL (ref 9–23)
BUN/CREAT SERPL: 12.3 (ref 10–20)
CALCIUM BLD-MCNC: 10.2 MG/DL (ref 8.7–10.4)
CHLORIDE SERPL-SCNC: 104 MMOL/L (ref 98–112)
CO2 SERPL-SCNC: 30 MMOL/L (ref 21–32)
CREAT BLD-MCNC: 0.81 MG/DL
EGFRCR SERPLBLD CKD-EPI 2021: 91 ML/MIN/1.73M2 (ref 60–?)
FASTING STATUS PATIENT QL REPORTED: NO
GLUCOSE BLD-MCNC: 97 MG/DL (ref 70–99)
OSMOLALITY SERPL CALC.SUM OF ELEC: 289 MOSM/KG (ref 275–295)
POTASSIUM SERPL-SCNC: 3.9 MMOL/L (ref 3.5–5.1)
SODIUM SERPL-SCNC: 140 MMOL/L (ref 136–145)

## 2025-01-08 PROCEDURE — 99214 OFFICE O/P EST MOD 30 MIN: CPT | Performed by: FAMILY MEDICINE

## 2025-01-08 PROCEDURE — 3074F SYST BP LT 130 MM HG: CPT | Performed by: FAMILY MEDICINE

## 2025-01-08 PROCEDURE — 36415 COLL VENOUS BLD VENIPUNCTURE: CPT

## 2025-01-08 PROCEDURE — 80048 BASIC METABOLIC PNL TOTAL CA: CPT

## 2025-01-08 PROCEDURE — 3079F DIAST BP 80-89 MM HG: CPT | Performed by: FAMILY MEDICINE

## 2025-01-08 RX ORDER — HYDROCHLOROTHIAZIDE 12.5 MG/1
12.5 TABLET ORAL DAILY
COMMUNITY
Start: 2024-12-16 | End: 2025-01-08

## 2025-01-08 RX ORDER — HYDROCHLOROTHIAZIDE 25 MG/1
25 TABLET ORAL DAILY
Qty: 90 TABLET | Refills: 3 | Status: SHIPPED | OUTPATIENT
Start: 2025-01-08 | End: 2026-01-03

## 2025-01-08 NOTE — PROGRESS NOTES
Letitia Carranza is a 44 year old female.  Chief Complaint   Patient presents with    Follow - Up     Pt is here to follow up on BP.       HPI:   Patient is a 44-year-old female who presents today for blood pressure check.  Patient's blood pressures were running in the 150s over 90s range after starting phentermine for weight management.  Patient was taken off that medication patient's carvedilol was increased up to 12.5 mg.  Patient was started on hydrochlorothiazide 12.5 mg.  Patient's blood pressure today 122/86.  Patient's blood pressures at home occasionally in the 120s over 90s.  Patient still off the weight loss medication    Current Outpatient Medications   Medication Sig Dispense Refill    hydroCHLOROthiazide 25 MG Oral Tab Take 1 tablet (25 mg total) by mouth daily. 90 tablet 3    carvedilol 12.5 MG Oral Tab Take 1 tablet (12.5 mg total) by mouth 2 (two) times daily with meals. 180 tablet 0      Past Medical History:    Congenital adrenal hyperplasia (HCC)    Herpes simplex    High blood pressure    Infertility, female    Pregnancy-induced hypertension (HCC)      Past Surgical History:   Procedure Laterality Date          Colonoscopy N/A 2024    Procedure: COLONOSCOPY;  Surgeon: Florencio Bonilla MD;  Location: [facility] MAIN OR    Hernia surgery       Hernia surgery        Social History:  Social History     Socioeconomic History    Marital status:    Tobacco Use    Smoking status: Never     Passive exposure: Past    Smokeless tobacco: Never   Vaping Use    Vaping status: Never Used   Substance and Sexual Activity    Alcohol use: Yes     Alcohol/week: 0.0 standard drinks of alcohol     Comment: Seldom.     Drug use: No    Sexual activity: Yes     Partners: Male     Social Drivers of Health      Received from [facility], [facility]    Social Connections    Received from [facility], [facility]     Housing Stability        REVIEW OF SYSTEMS:   GENERAL HEALTH: No fevers, chills, sweats, fatigue  VISION: No recent vision problems, blurry vision or double vision  HEENT: No decreased hearing ear pain nasal congestion or sore throat  SKIN: denies any unusual skin lesions or rashes  RESPIRATORY: denies shortness of breath, cough, wheezing  CARDIOVASCULAR: denies chest pain on exertion, palpitations, swelling in feet  GI: denies abdominal pain and denies heartburn, nausea or vomiting  : No Pain on urination, change in the color of urine, discharge, urinating frequently  MUS: No back pain, joint pain, muscle pain  NEURO: denies headaches , anxiety, depression    EXAM:   /86 (BP Location: Left arm, Patient Position: Sitting, Cuff Size: adult)   Pulse 68   Temp 98 °F (36.7 °C) (Temporal)   Resp 16   Wt 160 lb (72.6 kg)   LMP 10/25/2024 (Exact Date)   SpO2 98%   BMI 35.87 kg/m²   GENERAL: well developed, well nourished,in no apparent distress        ASSESSMENT AND PLAN:   1. Essential hypertension  Patient will continue with the increased carvedilol dosage.  We will increase hydrochlorothiazide to 25 mg daily.  BMP today.  Patient to stay off phentermine  - Basic Metabolic Panel (8) [E]; Future    2. Urinary incontinence, unspecified type  Patient has had incontinence issues since she was in her 20s she is used to pad for any leakage.  Has never seen a urologist.  Will give her referral to urology today also given a referral for physical therapy pelvic floor after she sees urology  - PHYSICAL THERAPY - INTERNAL  - Urology Referral - In Network  I spent 30 minutes with the patient both discussing the issues with the weight loss medication, discussing blood pressure medications, discussing goals of blood pressure, discussing incontinence issues, discussing referrals and doing documentation       The patient indicates understanding of these issues and agrees to the plan.  No follow-ups on file.

## 2025-02-12 ENCOUNTER — OFFICE VISIT (OUTPATIENT)
Dept: ENDOCRINOLOGY CLINIC | Facility: CLINIC | Age: 45
End: 2025-02-12

## 2025-02-12 VITALS
DIASTOLIC BLOOD PRESSURE: 88 MMHG | WEIGHT: 161 LBS | SYSTOLIC BLOOD PRESSURE: 133 MMHG | HEIGHT: 56 IN | BODY MASS INDEX: 36.22 KG/M2 | HEART RATE: 69 BPM

## 2025-02-12 DIAGNOSIS — R79.89 HIGH SERUM TESTOSTERONE: ICD-10-CM

## 2025-02-12 DIAGNOSIS — E55.9 VITAMIN D INSUFFICIENCY: ICD-10-CM

## 2025-02-12 PROCEDURE — 3008F BODY MASS INDEX DOCD: CPT | Performed by: INTERNAL MEDICINE

## 2025-02-12 PROCEDURE — 99213 OFFICE O/P EST LOW 20 MIN: CPT | Performed by: INTERNAL MEDICINE

## 2025-02-12 PROCEDURE — 3079F DIAST BP 80-89 MM HG: CPT | Performed by: INTERNAL MEDICINE

## 2025-02-12 PROCEDURE — 3075F SYST BP GE 130 - 139MM HG: CPT | Performed by: INTERNAL MEDICINE

## 2025-03-19 ENCOUNTER — PATIENT MESSAGE (OUTPATIENT)
Dept: FAMILY MEDICINE CLINIC | Facility: CLINIC | Age: 45
End: 2025-03-19

## 2025-03-19 ENCOUNTER — PATIENT MESSAGE (OUTPATIENT)
Dept: ENDOCRINOLOGY CLINIC | Facility: CLINIC | Age: 45
End: 2025-03-19

## 2025-03-19 DIAGNOSIS — Z12.31 ENCOUNTER FOR SCREENING MAMMOGRAM FOR MALIGNANT NEOPLASM OF BREAST: Primary | ICD-10-CM

## 2025-03-19 RX ORDER — SCOPOLAMINE 1 MG/3D
1 PATCH, EXTENDED RELEASE TRANSDERMAL
Qty: 7 PATCH | Refills: 0 | Status: SHIPPED | OUTPATIENT
Start: 2025-03-19

## 2025-04-08 ENCOUNTER — OFFICE VISIT (OUTPATIENT)
Dept: SURGERY | Facility: CLINIC | Age: 45
End: 2025-04-08
Payer: COMMERCIAL

## 2025-04-08 ENCOUNTER — HOSPITAL ENCOUNTER (OUTPATIENT)
Dept: ULTRASOUND IMAGING | Age: 45
Discharge: HOME | End: 2025-04-08
Attending: INTERNAL MEDICINE
Payer: COMMERCIAL

## 2025-04-08 VITALS — BODY MASS INDEX: 35.99 KG/M2 | WEIGHT: 160 LBS | HEIGHT: 56 IN

## 2025-04-08 DIAGNOSIS — R79.89 HIGH SERUM TESTOSTERONE: ICD-10-CM

## 2025-04-08 DIAGNOSIS — R39.9 LOWER URINARY TRACT SYMPTOMS: Primary | ICD-10-CM

## 2025-04-08 PROCEDURE — 3008F BODY MASS INDEX DOCD: CPT | Performed by: UROLOGY

## 2025-04-08 PROCEDURE — 76830 TRANSVAGINAL US NON-OB: CPT | Performed by: INTERNAL MEDICINE

## 2025-04-08 PROCEDURE — 76856 US EXAM PELVIC COMPLETE: CPT | Performed by: INTERNAL MEDICINE

## 2025-04-08 PROCEDURE — 99204 OFFICE O/P NEW MOD 45 MIN: CPT | Performed by: UROLOGY

## 2025-04-08 RX ORDER — CARVEDILOL 12.5 MG/1
12.5 TABLET ORAL 2 TIMES DAILY
COMMUNITY
Start: 2025-03-18

## 2025-04-08 RX ORDER — HYDROCHLOROTHIAZIDE 12.5 MG/1
12.5 TABLET ORAL DAILY
COMMUNITY
Start: 2025-03-17

## 2025-04-08 NOTE — PROGRESS NOTES
Kari Pool MD  Department of Urology  [address] [place] [address] [address]  [place] [address]    T: [phone]  F: [phone]    To: Donya Hayden MD   [address] [place] [id number]    Re: Letitia Carranza   MRN: [id number]  : 11/3/1980    Dear Donya Hayden MD,    Today I had the pleasure of seeing Letitia Carranza in my clinic. As you know, Ms. Carranza is a pleasant 44 year old year old female who I am seeing for urinary incontinence. Patient was last seen in this department on Visit date not found.    Briefly, patient states that she has urinary incontinence since the 20s. She reports urgency and frequency as well as stress incontinence.       PAST MEDICAL HISTORY:  Past Medical History:    Congenital adrenal hyperplasia (HCC)    Herpes simplex    High blood pressure    Infertility, female    Pregnancy-induced hypertension (HCC)        PAST SURGICAL HISTORY:  Past Surgical History:   Procedure Laterality Date          Colonoscopy N/A 2024    Procedure: COLONOSCOPY;  Surgeon: Florencio Bonilla MD;  Location: [facility] MAIN OR    Hernia surgery       Hernia surgery           ALLERGIES:  Allergies[1]      MEDICATIONS:  Current Outpatient Medications   Medication Instructions    hydroCHLOROthiazide 25 mg, Oral, Daily    Scopolamine 1.5mg TD patch 1mg/3days 1 patch, Transdermal, Every 3 days        FAMILY HISTORY:  Family History   Problem Relation Age of Onset    Cancer Mother 49        Lung Cancer     Diabetes Mother     Breast Cancer Paternal Aunt         40S    Breast Cancer Paternal Aunt         40S    Breast Cancer Paternal Aunt         60S        SOCIAL HISTORY:  Social History     Socioeconomic History    Marital status:    Tobacco Use    Smoking status: Never     Passive exposure: Past    Smokeless tobacco: Never   Vaping Use    Vaping status: Never Used   Substance and Sexual Activity    Alcohol use: Yes     Alcohol/week: 0.0 standard  drinks of alcohol     Comment: Seldom.     Drug use: No    Sexual activity: Yes     Partners: Male     Social Drivers of Health      [facility]    Housing Stability          PHYSICAL EXAMINATION:  There were no vitals filed for this visit.  CONSTITUTIONAL: No apparent distress, cooperative and communicative  NEUROLOGIC: Alert and oriented   HEAD: Normocephalic, atraumatic   EYES: Sclera non-icteric   ENT: Hearing intact, moist mucous membranes   NECK: No obvious goiter or masses   RESPIRATORY: Normal respiratory effort, Nonlabored breathing on room air  SKIN: No evident rashes   ABDOMEN: Soft, nontender, nondistended, no rebound tenderness, no guarding, no masses      REVIEW OF SYSTEMS:    A comprehensive 10-point review of systems was completed.  Pertinent positives and negatives are noted in the the HPI.       LABORATORY DATA:      Component  Ref Range & Units 1/8/25 11:33 AM   Glucose  70 - 99 mg/dL 97   Sodium  136 - 145 mmol/L 140   Potassium  3.5 - 5.1 mmol/L 3.9   Chloride  98 - 112 mmol/L 104   CO2  21.0 - 32.0 mmol/L 30.0   Anion Gap  0 - 18 mmol/L 6   BUN  9 - 23 mg/dL 10   Creatinine  0.55 - 1.02 mg/dL 0.81   BUN/CREA Ratio  10.0 - 20.0 12.3   Calcium, Total  8.7 - 10.4 mg/dL 10.2   Calculated Osmolality  275 - 295 mOsm/kg 289   eGFR-Cr  >=60 mL/min/1.73m2 91   Patient Fasting for BMP? No              IMAGING REVIEW:  Narrative   PROCEDURE: CT CALCIUM SCORING OVER READ     COMPARISON: None available.     INDICATIONS: Screening for cardiovascular condition     TECHNIQUE:   CT calcium score was performed.  The raw data from that study was reprocessed into images of the mediastinum and lung fields in the area that was scanned.  Automated exposure control for dose reduction was used. Adjustment of the mA and/or  kV was done based on the patient's size. Iterative reconstruction technique for dose reduction was employed. Dose information was  transmitted to the ACR (American College of Radiology) NRDR (National Radiology Data Registry), which includes the Dose  Index Registry. The CT Calcium Score will be reported separately by the cardiologist.       FINDINGS:    COMMENT: Evaluation of the vasculature and ike is suboptimal in the absence of contrast infusion.  CARDIAC: Please see the cardiologist report for further details.  VASCULATURE: The visualized portions of the thoracic aorta are grossly ectatic in appearance, measuring up to 3.7 cm.    LUNGS/PLEURA: A right middle lobe 0.3 x 0.4 cm ovoid peribronchovascular nodular opacities noted (series 4, image 16). A medial pericardial micronodule is also seen (series 4, image 7). A pleural-based micronodule measuring 0.2 x 0.3 cm is evident  (series 4, image 28). No airspace consolidation, pleural effusion, or pneumothorax is detected in the imaged region.    AIRWAYS: The distal airways appear grossly unremarkable.  MEDIASTINUM/IKE: No mass or lymphadenopathy within the imaged volume.    CHEST WALL: No gross abnormalities are detected in the imaged volume.    LIMITED ABDOMEN: Included portions of the unenhanced upper abdomen are notable for an indeterminate hypoattenuating hepatic mass measuring 5.4 x 5.6 cm (series 3, image 31).    BONES: Multilevel degenerative changes of the included portion of the thoracic spine are apparent.  OTHER: A small hiatal hernia is evident.     Please see separate report for CT Calcium Score.               Impression   CONCLUSION:  1. Cardiac over-read performed. Please see the separately dictated cardiologist report for further details.     2. There is an indeterminate suspected hepatic mass, incompletely characterized. Follow-up MRI of the abdomen with and without contrast is recommended for further assessment.     3. A few micronodules are seen. If the patient is considered high risk, follow-up completion chest CT can be considered.     4. Mild ascending thoracic aortic  ectasia.     5. Lesser incidental findings as above.           elm-remote.        Dictated by (CST): Fer Guevara MD on 5/24/2022 at 10:55 PM      Finalized by (CST): Fer Guevara MD on 5/24/2022 at 11:00 PM              OTHER RELEVANT DATA:   none     IMPRESSION: mixed incontinence- recommended behavioral management, voiding diary. If no improvement can consider diagnostic testing or other management strategy as listed below.    URGENCY INCONTINENCE  Discussed treatment options for urgency urinary incontinence including behavioral management (timed voiding, double voiding, avoiding bladder irritants, constipation management), antimuscarinics (side effects include dry eyes, dry mouth, constipation, mental fogginess), beta 3 agonist (the side effects include hypertension), bladder Botox, PTNS, sacral neuromodulation.    Behavioral management includes timed voiding (going to the bathroom on a schedule every 1-4 hours), double voiding (trying to pee twice), avoiding bladder irritants (coffee, tea, soda, pop, alcohol), compression stockings, elevation of feet, voiding prior to bedtime, avoiding fluids 2 to 4 hours before bedtime and constipation management.     For constipation management she can consider fruits (especially ones that start with \"P\"), vegetables, flaxseeds, hydration, fiber, MiraLAX, Colace or senna.  She can also use a squatty potty to help with efficiency of stooling.     It may not be a complete solution but should improve her symptoms.  If she has absolutely no improvement in her symptoms, we can proceed with the evaluation test including a cystoscopy, pelvic examination and possible urodynamic evaluation versus trialing medical therapy.    Patient understands antimuscarinics and beta 3 agonist take 6 to 8 weeks to start working.  Patient also understands the bladder Botox has the side effect of urinary retention in 10 to 12% of patients and does not start working for about 2 weeks post  procedure.  Additionally it wears off with time and repeat treatments may be required every 3 to 12 months.  Patient understand that sacroneuromodulation is a surgical treatment, and that PTNS requires a significant amount of time commitment coming in every week for 12 weeks followed by monthly treatments if it is effective.    STRESS INCONTINENCE  For stress urinary incontinence, I discussed the treatment options of behavioral management (Kegel exercises, weight loss, avoidance of constipation, avoidance of strong coughing, timed voiding, double voiding), incontinence pessary, mid urethral sling, urethral bulking agent.    I discussed that there are 3 sling types on the market including a pubovaginal sling, transobturator sling and single incision sling.  She understands of the transobturator sling would have 2 groin incisions that may cause groin discomfort with adduction for 6 to 8 weeks postprocedure potentially indefinitely, she understands that the pubovaginal sling would have to incisions above her suprapubic area which increases the risk of potential injury to the bladder and may cause some voiding dysfunction.  The single incision sling has only one incision.    Reviewed the general risks of the procedure including bleeding, infection, damage to surrounding structures (bladder, bowel, urethra, vagina, ureter), need for additional procedures, mesh exposure, mesh erosion into the bladder or urethra, urinary retention, need for sling loosening, dyspareunia or hispareunia, voiding dysfunction, groin pain, anesthesia complications, persistent stress urinary incontinence.           PLAN:  RTC 6-8 weeks with voiding diary prn per patient preference  Behavioral management    Thank you for referring this very pleasant patient to my clinic. If you have any questions or concerns, please do not hesitate to contact me.    Sincerely,  Kari Pool MD    30 minutes were spent on this patient at this visit obtaining a  history, reviewing medical records, developing a treatment plan, counseling and discussing treatment strategy with patient, coordination of care and documentation.     The 21st Century Cures Act makes medical notes available to patients in the interest of transparency.  However, please be advised that this is a medical document.  It is intended as a peer to peer communication.  It is written in medical language and may contain abbreviations or verbiage that are technical and unfamiliar.  It may appear blunt or direct.  Medical documents are intended to carry relevant information, facts as evident, and the clinical opinion of the practitioner.         [1]   Allergies  Allergen Reactions    Penicillins UNKNOWN     As a child

## 2025-04-18 ENCOUNTER — HOSPITAL ENCOUNTER (OUTPATIENT)
Dept: MAMMOGRAPHY | Age: 45
Discharge: HOME | End: 2025-04-18
Attending: FAMILY MEDICINE
Payer: COMMERCIAL

## 2025-04-18 DIAGNOSIS — Z12.31 ENCOUNTER FOR SCREENING MAMMOGRAM FOR MALIGNANT NEOPLASM OF BREAST: ICD-10-CM

## 2025-04-18 PROCEDURE — 77063 BREAST TOMOSYNTHESIS BI: CPT | Performed by: FAMILY MEDICINE

## 2025-04-18 PROCEDURE — 77067 SCR MAMMO BI INCL CAD: CPT | Performed by: FAMILY MEDICINE

## (undated) DEVICE — 3M™ STERI-STRIP™ REINFORCED ADHESIVE SKIN CLOSURES, R1547, 1/2 IN X 4 IN (12 MM X 100 MM), 6 STRIPS/ENVELOPE: Brand: 3M™ STERI-STRIP™

## (undated) DEVICE — STERILE LATEX POWDER-FREE SURGICAL GLOVESWITH NITRILE COATING: Brand: PROTEXIS

## (undated) DEVICE — C SECTION PACK: Brand: MEDLINE INDUSTRIES, INC.

## (undated) DEVICE — NON-ADHERENT PAD PREPACK: Brand: TELFA

## (undated) DEVICE — SUTURE PLAIN GUT 3-0 CT-1

## (undated) DEVICE — KENDALL SCD EXPRESS SLEEVES, KNEE LENGTH, MEDIUM: Brand: KENDALL SCD

## (undated) DEVICE — REM POLYHESIVE ADULT PATIENT RETURN ELECTRODE: Brand: VALLEYLAB

## (undated) DEVICE — SUTURE MONOCRYL 4-0 PS-2

## (undated) DEVICE — ABDOMINAL PAD: Brand: CURITY

## (undated) DEVICE — COVER SGL STRL LGHT HNDL BLU

## (undated) DEVICE — VIOLET BRAIDED (POLYGLACTIN 910), SYNTHETIC ABSORBABLE SUTURE: Brand: COATED VICRYL

## (undated) DEVICE — STERILE POLYISOPRENE POWDER-FREE SURGICAL GLOVES: Brand: PROTEXIS

## (undated) NOTE — MR AVS SNAPSHOT
After Visit Summary   1/4/2022    Alaina Bingham   MRN: [id number]           Visit Information     Date & Time  1/4/2022  1:30 PM Provider  Yohana Navarro MD [address] [facility], [place] [address], [facility] Dept.  Phone  [phone] patients for a variety of conditions such as allergies, back pain and cold symptoms? Skip the drive and waiting room and online chat with a doctor face-to-face using your web-cam enabled computer or mobile device wherever you are.  Video Visits cost $50 and

## (undated) NOTE — LETTER
Date & Time: 5/12/2023, 9:31 AM  Patient: Samson Adair  Encounter Provider(s):    MITCHELL Yuan       To Whom It May Concern:    Samson Adair was seen and treated in our department on 5/12/2023. She can return to work 05/13/2023.     MITCHELL Villalobos, 05/12/23, 9:32 AM

## (undated) NOTE — LETTER
2/16/2018             RE: Lex Brown        [address] [facility]               To Whom It May Concern,           Koko Yañez can return to the gym without any restrictions.           Sincerely,    Rolando Caruso MD

## (undated) NOTE — LETTER
VACCINE ADMINISTRATION RECORD  PARENT / GUARDIAN APPROVAL  Date: 3/22/2018  Vaccine administered to:  Meredith Gaffney     : 11/3/1980    MRN: [id number]    A copy of the appropriate Centers for Disease Control and Prevention Vaccine Information sta